# Patient Record
Sex: FEMALE | Race: BLACK OR AFRICAN AMERICAN | URBAN - METROPOLITAN AREA
[De-identification: names, ages, dates, MRNs, and addresses within clinical notes are randomized per-mention and may not be internally consistent; named-entity substitution may affect disease eponyms.]

---

## 2022-07-22 ENCOUNTER — OFFICE VISIT (OUTPATIENT)
Dept: FAMILY MEDICINE CLINIC | Age: 72
End: 2022-07-22
Payer: MEDICARE

## 2022-07-22 VITALS
TEMPERATURE: 97.6 F | DIASTOLIC BLOOD PRESSURE: 76 MMHG | HEART RATE: 63 BPM | HEIGHT: 67 IN | OXYGEN SATURATION: 98 % | RESPIRATION RATE: 16 BRPM | WEIGHT: 165 LBS | BODY MASS INDEX: 25.9 KG/M2 | SYSTOLIC BLOOD PRESSURE: 146 MMHG

## 2022-07-22 DIAGNOSIS — R73.03 PREDIABETES: ICD-10-CM

## 2022-07-22 DIAGNOSIS — E03.9 ACQUIRED HYPOTHYROIDISM: Primary | ICD-10-CM

## 2022-07-22 DIAGNOSIS — E78.2 MIXED HYPERLIPIDEMIA: ICD-10-CM

## 2022-07-22 DIAGNOSIS — Z11.59 ENCOUNTER FOR HEPATITIS C SCREENING TEST FOR LOW RISK PATIENT: ICD-10-CM

## 2022-07-22 DIAGNOSIS — Z00.00 ENCOUNTER FOR MEDICAL EXAMINATION TO ESTABLISH CARE: ICD-10-CM

## 2022-07-22 DIAGNOSIS — I10 PRIMARY HYPERTENSION: ICD-10-CM

## 2022-07-22 PROBLEM — M85.852 OSTEOPENIA OF LEFT HIP: Status: ACTIVE | Noted: 2022-07-22

## 2022-07-22 PROCEDURE — G8400 PT W/DXA NO RESULTS DOC: HCPCS | Performed by: STUDENT IN AN ORGANIZED HEALTH CARE EDUCATION/TRAINING PROGRAM

## 2022-07-22 PROCEDURE — 1123F ACP DISCUSS/DSCN MKR DOCD: CPT | Performed by: STUDENT IN AN ORGANIZED HEALTH CARE EDUCATION/TRAINING PROGRAM

## 2022-07-22 PROCEDURE — G8432 DEP SCR NOT DOC, RNG: HCPCS | Performed by: STUDENT IN AN ORGANIZED HEALTH CARE EDUCATION/TRAINING PROGRAM

## 2022-07-22 PROCEDURE — 3017F COLORECTAL CA SCREEN DOC REV: CPT | Performed by: STUDENT IN AN ORGANIZED HEALTH CARE EDUCATION/TRAINING PROGRAM

## 2022-07-22 PROCEDURE — 99204 OFFICE O/P NEW MOD 45 MIN: CPT | Performed by: STUDENT IN AN ORGANIZED HEALTH CARE EDUCATION/TRAINING PROGRAM

## 2022-07-22 PROCEDURE — G8536 NO DOC ELDER MAL SCRN: HCPCS | Performed by: STUDENT IN AN ORGANIZED HEALTH CARE EDUCATION/TRAINING PROGRAM

## 2022-07-22 PROCEDURE — 1090F PRES/ABSN URINE INCON ASSESS: CPT | Performed by: STUDENT IN AN ORGANIZED HEALTH CARE EDUCATION/TRAINING PROGRAM

## 2022-07-22 PROCEDURE — G8417 CALC BMI ABV UP PARAM F/U: HCPCS | Performed by: STUDENT IN AN ORGANIZED HEALTH CARE EDUCATION/TRAINING PROGRAM

## 2022-07-22 PROCEDURE — 1101F PT FALLS ASSESS-DOCD LE1/YR: CPT | Performed by: STUDENT IN AN ORGANIZED HEALTH CARE EDUCATION/TRAINING PROGRAM

## 2022-07-22 PROCEDURE — G8427 DOCREV CUR MEDS BY ELIG CLIN: HCPCS | Performed by: STUDENT IN AN ORGANIZED HEALTH CARE EDUCATION/TRAINING PROGRAM

## 2022-07-22 RX ORDER — VALSARTAN 80 MG/1
80 TABLET ORAL DAILY
Qty: 90 TABLET | Refills: 1 | Status: SHIPPED | OUTPATIENT
Start: 2022-07-22

## 2022-07-22 RX ORDER — VALSARTAN 80 MG/1
80 TABLET ORAL DAILY
COMMUNITY
End: 2022-07-22 | Stop reason: SDUPTHER

## 2022-07-22 RX ORDER — LEVOTHYROXINE SODIUM 88 UG/1
88 TABLET ORAL
COMMUNITY
End: 2022-07-22 | Stop reason: SDUPTHER

## 2022-07-22 RX ORDER — LEVOTHYROXINE SODIUM 88 UG/1
88 TABLET ORAL
Qty: 90 TABLET | Refills: 1 | Status: SHIPPED | OUTPATIENT
Start: 2022-07-22

## 2022-07-22 RX ORDER — CHOLECALCIFEROL (VITAMIN D3) 125 MCG
10 CAPSULE ORAL AS NEEDED
COMMUNITY

## 2022-07-22 RX ORDER — SIMVASTATIN 40 MG/1
1 TABLET, FILM COATED ORAL
COMMUNITY
Start: 2022-07-19 | End: 2022-07-22 | Stop reason: SDUPTHER

## 2022-07-22 RX ORDER — SIMVASTATIN 40 MG/1
40 TABLET, FILM COATED ORAL
Qty: 90 TABLET | Refills: 1 | Status: SHIPPED | OUTPATIENT
Start: 2022-07-22 | End: 2022-07-25

## 2022-07-22 NOTE — PROGRESS NOTES
Identified pt with two pt identifiers(name and ). Reviewed record in preparation for visit and have obtained necessary documentation. Chief Complaint   Patient presents with    Establish Care     Patient just moved from [place]. , PCP Dr Norma Thomas, eye exam last completed in , colonoscopy last completed about 4 years ago, dexa scan completed 2022, mammogram completed 2022, have not had shingrix vaccine, patient states tdap vaccine could potentially be after 10 years        Health Maintenance Due   Topic    Hepatitis C Screening     Depression Screen     COVID-19 Vaccine (1)    Lipid Screen     DTaP/Tdap/Td series (1 - Tdap)    Colorectal Cancer Screening Combo     Shingrix Vaccine Age 50> (1 of 2)    Breast Cancer Screen Mammogram     Bone Densitometry (Dexa) Screening     Pneumococcal 65+ years (1 - PCV)    Medicare Yearly Exam        Visit Vitals  BP (!) 146/76 (BP 1 Location: Right upper arm, BP Patient Position: Sitting, BP Cuff Size: Adult)   Pulse 63   Temp 97.6 °F (36.4 °C) (Oral)   Resp 16   Ht 5' 7.17\" (1.706 m)   Wt 165 lb (74.8 kg)   SpO2 98%   BMI 25.72 kg/m²         Coordination of Care Questionnaire:  :   1) Have you been to an emergency room, urgent care, or hospitalized since your last visit? If yes, where when, and reason for visit? Establishing care,  patient coming from [place]        Patient is accompanied by self I have received verbal consent from Alcon Leung to discuss any/all medical information while they are present in the room. Patient notified of elevated BP, this writer notified Physician of elevated BP.  BP rechecked and remains elevated at 146/76 at this time

## 2022-07-22 NOTE — PROGRESS NOTES
Janice Bhat is an 70 y.o. female who presents to [facility] care   Patient was previously receiving care at: moved in [place] [address]. history significant for: HTN, HLD, Hypothyroid, PreDM    Currently not having any complaints. Gyn Care  Menopausal - no bleeding    Mammogram 2022 - reports was normal - will request records. Review of Systems   General/Constitutional:   No headache, fever, fatigue, weight loss or weight gain       Eyes:   No redness, pruritis, pain, visual changes, swelling, or discharge      Ears:    No pain, loss or changes in hearing     Neck:   No swelling, masses, stiffness, pain, or limited movement     Cardiac:    No chest pain      Respiratory:   No cough or shortness of breath     GI:   No nausea/vomiting, diarrhea, abdominal pain, bloody or dark stools       :   No dysuria or  hematuria    Neurological:   No loss of consciousness, dizziness, seizures, dysarthria, cognitive changes, memory changes,  problems with balance, or unilateral weakness     Skin: No rash     Current Medications  Current medications include:   Current Outpatient Medications   Medication Sig    melatonin 5 mg tablet Take 10 mg by mouth as needed (at bedtime). levothyroxine (SYNTHROID) 88 mcg tablet Take 1 Tablet by mouth Daily (before breakfast). valsartan (DIOVAN) 80 mg tablet Take 1 Tablet by mouth in the morning. simvastatin (ZOCOR) 40 mg tablet Take 1 Tablet by mouth nightly. No current facility-administered medications for this visit.        Allergies  Allergies   Allergen Reactions    Aleve [Naproxen Sodium] Hives and Swelling       Past Medical History  Past Medical History:   Diagnosis Date    Hypercholesterolemia     Hypertension     Thyroid disease        Past Surgical History   Past Surgical History:   Procedure Laterality Date    HX  SECTION         Family History  Family History   Problem Relation Age of Onset    Hypertension Mother     Diabetes Mother      Social History  Social History     Socioeconomic History    Marital status: Not on file     Spouse name: Not on file    Number of children: Not on file    Years of education: Not on file    Highest education level: Not on file   Occupational History    Not on file   Tobacco Use    Smoking status: Never     Passive exposure: Never    Smokeless tobacco: Never   Vaping Use    Vaping Use: Never used   Substance and Sexual Activity    Alcohol use: Yes     Alcohol/week: 1.0 standard drink     Types: 1 Glasses of wine per week     Comment: occasional glass of wine    Drug use: Never    Sexual activity: Not Currently   Other Topics Concern    Not on file   Social History Narrative    Not on file     Social Determinants of Health     Financial Resource Strain: Low Risk     Difficulty of Paying Living Expenses: Not hard at all   Food Insecurity: No Food Insecurity    Worried About Running Out of Food in the Last Year: Never true    Ran Out of Food in the Last Year: Never true   Transportation Needs: Not on file   Physical Activity: Not on file   Stress: Not on file   Social Connections: Not on file   Intimate Partner Violence: Not on file   Housing Stability: Not on file       Immunizations    There is no immunization history on file for this patient. Objective   Vital Signs  Visit Vitals  BP (!) 146/76 (BP 1 Location: Right upper arm, BP Patient Position: Sitting, BP Cuff Size: Adult)   Pulse 63   Temp 97.6 °F (36.4 °C) (Oral)   Resp 16   Ht 5' 7.17\" (1.706 m)   Wt 165 lb (74.8 kg)   SpO2 98%   BMI 25.72 kg/m²     BP elevated x 2. Physical Examination  GEN: No apparent distress. Alert and oriented and responds to all questions appropriately. EYES:  Conjunctiva clear; pupils round and reactive to light; extraocular movements areintact. EAR: External ears are normal.  Tympanic membranes are clear and without effusion. NOSE: Turbinates are within normal limits. No drainage  OROPHYARYNX: No oral lesions or exudates.   NECK: Supple; no masses; thyroid normal           LUNGS: Respirations unlabored; clear to auscultation bilaterally  CARDIOVASCULAR: Regular, rate, and rhythm without murmurs, gallops or rubs   ABDOMEN: Soft; nontender; nondistended; normoactive bowel sounds; no masses or organomegaly  NEUROLOGIC:  No focal neurologic deficits. Strength and sensation grossly intact. Coordination and gait grossly intact. EXT: Well perfused. No edema. SKIN: No obvious rashes. Assessment:   Trecia Canavan is a 70 y.o. here to establish to care     Plan:   1. Acquired hypothyroidism Stable on levothyroxine. Recheck TSH today.  - TSH 3RD GENERATION; Future  - levothyroxine (SYNTHROID) 88 mcg tablet; Take 1 Tablet by mouth Daily (before breakfast). Dispense: 90 Tablet; Refill: 1  - TSH 3RD GENERATION    2. Mixed hyperlipidemia Stable on simvastatin  - encouraged lifestyle modifications  - LIPID PANEL; Future  - simvastatin (ZOCOR) 40 mg tablet; Take 1 Tablet by mouth nightly. Dispense: 90 Tablet; Refill: 1  - LIPID PANEL    3. Primary hypertension BP elevated today, but home log is okay. Just at goal < 150/90 given age. - keep BP log and follow up at next visit  - METABOLIC PANEL, COMPREHENSIVE; Future  - valsartan (DIOVAN) 80 mg tablet; Take 1 Tablet by mouth in the morning. Dispense: 90 Tablet; Refill: 1  - METABOLIC PANEL, COMPREHENSIVE    4. Prediabetes  - encourage lifestyle modifications  - HEMOGLOBIN A1C WITH EAG; Future  - HEMOGLOBIN A1C WITH EAG    5. Encounter for hepatitis C screening test for low risk patient  - HEPATITIS C AB; Future  - HEPATITIS C AB    6. Encounter for medical examination to establish care Updated hx and HM as able. - will need [address] next month  - request records from prior doctor      Counseled re: diet, exercise, healthy lifestyle  Appropriate labs ordered as listed above    I discussed the aforementioned diagnoses with the patient as well as the plan of care.  All questions were answered and an AVS was provided.      I discussed this patient with Dr. Toby Spain (Attending Physician)      Signed By:  Ary Berumen,

## 2022-07-23 LAB
ALBUMIN SERPL-MCNC: 4.4 G/DL (ref 3.5–5)
ALBUMIN/GLOB SERPL: 1.3 {RATIO} (ref 1.1–2.2)
ALP SERPL-CCNC: 69 U/L (ref 45–117)
ALT SERPL-CCNC: 26 U/L (ref 12–78)
ANION GAP SERPL CALC-SCNC: 3 MMOL/L (ref 5–15)
AST SERPL-CCNC: 15 U/L (ref 15–37)
BILIRUB SERPL-MCNC: 0.5 MG/DL (ref 0.2–1)
BUN SERPL-MCNC: 12 MG/DL (ref 6–20)
BUN/CREAT SERPL: 18 (ref 12–20)
CALCIUM SERPL-MCNC: 9.8 MG/DL (ref 8.5–10.1)
CHLORIDE SERPL-SCNC: 103 MMOL/L (ref 97–108)
CHOLEST SERPL-MCNC: 235 MG/DL
CO2 SERPL-SCNC: 31 MMOL/L (ref 21–32)
CREAT SERPL-MCNC: 0.68 MG/DL (ref 0.55–1.02)
EST. AVERAGE GLUCOSE BLD GHB EST-MCNC: 117 MG/DL
GLOBULIN SER CALC-MCNC: 3.3 G/DL (ref 2–4)
GLUCOSE SERPL-MCNC: 92 MG/DL (ref 65–100)
HBA1C MFR BLD: 5.7 % (ref 4–5.6)
HCV AB SERPL QL IA: NONREACTIVE
HDLC SERPL-MCNC: 53 MG/DL
HDLC SERPL: 4.4 {RATIO} (ref 0–5)
LDLC SERPL CALC-MCNC: 152.6 MG/DL (ref 0–100)
POTASSIUM SERPL-SCNC: 4 MMOL/L (ref 3.5–5.1)
PROT SERPL-MCNC: 7.7 G/DL (ref 6.4–8.2)
SODIUM SERPL-SCNC: 137 MMOL/L (ref 136–145)
TRIGL SERPL-MCNC: 147 MG/DL (ref ?–150)
TSH SERPL DL<=0.05 MIU/L-ACNC: 1.09 UIU/ML (ref 0.36–3.74)
VLDLC SERPL CALC-MCNC: 29.4 MG/DL

## 2022-07-25 DIAGNOSIS — E78.2 MIXED HYPERLIPIDEMIA: Primary | ICD-10-CM

## 2022-07-25 RX ORDER — ATORVASTATIN CALCIUM 40 MG/1
40 TABLET, FILM COATED ORAL DAILY
Qty: 90 TABLET | Refills: 1 | Status: SHIPPED | OUTPATIENT
Start: 2022-07-25

## 2022-07-25 NOTE — PROGRESS NOTES
A1C in prediabetic range - recommend lifestyle modifications  CMP okay  Lipid panel with elevated TC and LDL - ASCVD 18.1% indicating moderate to high intensity statin - currently on moderate intensity statin  TSH okay  Hep C NR    Tried to call patient with no answer - will send result letter with recommendations.

## 2022-08-22 ENCOUNTER — OFFICE VISIT (OUTPATIENT)
Dept: FAMILY MEDICINE CLINIC | Age: 72
End: 2022-08-22
Payer: MEDICARE

## 2022-08-22 VITALS
HEART RATE: 80 BPM | DIASTOLIC BLOOD PRESSURE: 76 MMHG | HEIGHT: 67 IN | WEIGHT: 168 LBS | BODY MASS INDEX: 26.37 KG/M2 | TEMPERATURE: 97.5 F | RESPIRATION RATE: 18 BRPM | SYSTOLIC BLOOD PRESSURE: 130 MMHG | OXYGEN SATURATION: 99 %

## 2022-08-22 DIAGNOSIS — Z00.00 INITIAL MEDICARE ANNUAL WELLNESS VISIT: Primary | ICD-10-CM

## 2022-08-22 DIAGNOSIS — Z71.89 ADVANCED CARE PLANNING/COUNSELING DISCUSSION: ICD-10-CM

## 2022-08-22 DIAGNOSIS — Z78.9 FULL CODE STATUS: ICD-10-CM

## 2022-08-22 PROCEDURE — 3017F COLORECTAL CA SCREEN DOC REV: CPT

## 2022-08-22 PROCEDURE — G0438 PPPS, INITIAL VISIT: HCPCS

## 2022-08-22 PROCEDURE — G8427 DOCREV CUR MEDS BY ELIG CLIN: HCPCS

## 2022-08-22 PROCEDURE — G8400 PT W/DXA NO RESULTS DOC: HCPCS

## 2022-08-22 PROCEDURE — 1101F PT FALLS ASSESS-DOCD LE1/YR: CPT

## 2022-08-22 PROCEDURE — G8417 CALC BMI ABV UP PARAM F/U: HCPCS

## 2022-08-22 PROCEDURE — G8536 NO DOC ELDER MAL SCRN: HCPCS

## 2022-08-22 PROCEDURE — G8432 DEP SCR NOT DOC, RNG: HCPCS

## 2022-08-22 PROCEDURE — 1123F ACP DISCUSS/DSCN MKR DOCD: CPT

## 2022-08-22 PROCEDURE — 93000 ELECTROCARDIOGRAM COMPLETE: CPT

## 2022-08-22 NOTE — ACP (ADVANCE CARE PLANNING)
Advance Care Planning     Advance Care Planning (ACP) Physician/NP/PA Conversation      Date of Conversation: 8/22/2022  Conducted with: Patient with Decision Making Capacity    Healthcare Decision Maker:   No healthcare decision makers have been documented. Click here to complete [address] including selection of the Healthcare Decision Maker Relationship (ie \"Primary\")  Primary daughter Kiarra Chauhan [phone]  Secondary Son Latanya Arce [phone]    Today we documented Decision Maker(s) consistent with Legal Next of Kin hierarchy. Care Preferences:    Hospitalization: \"If your health worsens and it becomes clear that your chance of recovery is unlikely, what would be your preference regarding hospitalization? \"  Yes The patient prefers hospitalization    Ventilation: \"If you were unable to breathe on your own and your chance of recovery was unlikely, what would be your preference about the use of a ventilator (breathing machine) if it was available to you? \"   The patient would desire the use of a ventilator. Resuscitation: \"In the event your heart stopped as a result of an underlying serious health condition, would you want attempts to be made to restart your heart, or would you prefer a natural death? \"   Yes, attempt to resuscitate.     Additional topics discussed: No    Conversation Outcomes / Follow-Up Plan:   ACP complete - no further action today  Reviewed DNR/DNI and patient elects Full Code (Attempt Resuscitation)     Length of Voluntary ACP Conversation in minutes:  16 minutes    Ofelia Centeno MD

## 2022-08-22 NOTE — PATIENT INSTRUCTIONS
Medicare Wellness Visit, Female     The best way to live healthy is to have a lifestyle where you eat a well-balanced diet, exercise regularly, limit alcohol use, and quit all forms of tobacco/nicotine, if applicable. Regular preventive services are another way to keep healthy. Preventive services (vaccines, screening tests, monitoring & exams) can help personalize your care plan, which helps you manage your own care. Screening tests can find health problems at the earliest stages, when they are easiest to treat. [facility] follows the current, evidence-based guidelines published by the [facility] ([facility]) when recommending preventive services for our patients. Because we follow these guidelines, sometimes recommendations change over time as research supports it. (For example, mammograms used to be recommended annually. Even though Medicare will still pay for an annual mammogram, the newer guidelines recommend a mammogram every two years for women of average risk). Of course, you and your doctor may decide to screen more often for some diseases, based on your risk and your co-morbidities (chronic disease you are already diagnosed with). Preventive services for you include:  - Medicare offers their members a free annual wellness visit, which is time for you and your primary care provider to discuss and plan for your preventive service needs. Take advantage of this benefit every year!  -All adults over the age of 72 should receive the recommended pneumonia vaccines. Current USPSTF guidelines recommend a series of two vaccines for the best pneumonia protection.   -All adults should have a flu vaccine yearly and a tetanus vaccine every 10 years.   -All adults age 48 and older should receive the shingles vaccines (series of two vaccines).       -All adults age 38-68 who are overweight should have a diabetes screening test once every three years.   -All adults born between [birth year] and 1965 should be screened once for Hepatitis C.  -Other screening tests and preventive services for persons with diabetes include: an eye exam to screen for diabetic retinopathy, a kidney function test, a foot exam, and stricter control over your cholesterol.   -Cardiovascular screening for adults with routine risk involves an electrocardiogram (ECG) at intervals determined by your doctor.   -Colorectal cancer screenings should be done for adults age 54-65 with no increased risk factors for colorectal cancer. There are a number of acceptable methods of screening for this type of cancer. Each test has its own benefits and drawbacks. Discuss with your doctor what is most appropriate for you during your annual wellness visit. The different tests include: colonoscopy (considered the best screening method), a fecal occult blood test, a fecal DNA test, and sigmoidoscopy.    -A bone mass density test is recommended when a woman turns 65 to screen for osteoporosis. This test is only recommended one time, as a screening. Some providers will use this same test as a disease monitoring tool if you already have osteoporosis. -Breast cancer screenings are recommended every other year for women of normal risk, age 54-69.  -Cervical cancer screenings for women over age 72 are only recommended with certain risk factors.      Here is a list of your current Health Maintenance items (your personalized list of preventive services) with a due date:  Health Maintenance Due   Topic Date Due    COVID-19 Vaccine (1) Never done    DTaP/Tdap/Td  (1 - Tdap) Never done    Colorectal Screening  Never done    Shingles Vaccine (1 of 2) Never done    Mammogram  Never done    Bone Mineral Density   Never done    Pneumococcal Vaccine (1 - PCV) Never done    Annual Well Visit  Never done

## 2022-08-22 NOTE — PROGRESS NOTES
[address]  [address] [facility] [address]  [phone]             Date of visit: 2022       This is an Initial Medicare Annual Wellness Visit (AWV), (Performed more than 12 months after effective date of Medicare Part B enrollment and 12 months after last preventive visit, Once in a lifetime)    I have reviewed the patient's medical history in detail and updated the computerized patient record. Audra Rodriguez is a 70 y.o. female   History obtained from: the patient. Concerns today   (Patient understands that medical problems addressed today may incur additional cost as this is a preventive visit)    History     Patient reports that she is currently compliant with her medications for high cholesterol , hypertension and hypothyroidism. She has a good diet in place ,varied with vegetables and fresh produce. She tries to exercise whenever she can. She gets a good night of sleep for 7-8 hours as well. Denies chest pain, SOB, wheeze, nausea ,vomiting, abdominal pain, diarrhea, constipation, headache, LOC, falls, and dizziness    Patient Active Problem List   Diagnosis Code    Primary hypertension I10    Mixed hyperlipidemia E78.2    Acquired hypothyroidism E03.9    Prediabetes R73.03    Osteopenia of left hip M85.852     Past Medical History:   Diagnosis Date    Hypercholesterolemia     Hypertension     Thyroid disease       Past Surgical History:   Procedure Laterality Date    HX  SECTION       Allergies   Allergen Reactions    Aleve [Naproxen Sodium] Hives and Swelling     Current Outpatient Medications   Medication Sig Dispense Refill    atorvastatin (LIPITOR) 40 mg tablet Take 1 Tablet by mouth in the morning. 90 Tablet 1    melatonin 5 mg tablet Take 10 mg by mouth as needed (at bedtime). levothyroxine (SYNTHROID) 88 mcg tablet Take 1 Tablet by mouth Daily (before breakfast).  90 Tablet 1    valsartan (DIOVAN) 80 mg tablet Take 1 Tablet by mouth in the morning. 80 Tablet 1     Family History   Problem Relation Age of Onset    Hypertension Mother     Diabetes Mother      Social History     Tobacco Use    Smoking status: Never     Passive exposure: Never    Smokeless tobacco: Never   Substance Use Topics    Alcohol use: Yes     Alcohol/week: 1.0 standard drink     Types: 1 Glasses of wine per week     Comment: occasional glass of wine       Specialists/Care Team   Ingrid Nettles has established care with the following healthcare providers:  Dr Jd Ochoa MD PCP [address]     Demographics   female  70 y.o. General Health Questions   -During the past 4 weeks:   -how would you rate your health in general? Good   -how often have you been bothered by feeling dizzy when standing up? never   -how much have you been bothered by bodily pain? mildly   -Have you noticed any hearing difficulties? Yes soft sounds 3 years,    -has your physical and emotional health limited your social activities with family or friends? no    Emotional Health Questions   -Do you have a history of depression, anxiety, or emotional problems? no  -Over the past 2 weeks, have you felt down, depressed or hopeless? no  -Over the past 2 weeks, have you felt little interest or pleasure in doing things? no    Health Habits   Please describe your diet habits: plant based mostly, occasional poultry meat  Do you get 5 servings of fruits or vegetables daily? yes  Do you exercise regularly? yes    Activities of Daily Living and Functional Status   -Do you need help with eating, walking, dressing, bathing, toileting, the phone, transportation, shopping, preparing meals, housework, laundry, medications or managing money? no  -In the past four weeks, was someone available to help you if you needed and wanted help with anything? yes  -Are you confident are you that you can control and manage most of your health problems?  yes  -Have you been given information to help you keep track of your medications? yes  -How often do you have trouble taking your medications as prescribed? never    Fall Risk and Home Safety   Have you fallen 2 or more times in the past year? no  Does your home have rugs in the hallway, lack grab bars in the bathroom, lack handrails on the stairs or have poor lighting? no  Do you have smoke detectors and check them regularly? yes  Do you have difficulties driving a car? no  Do you always fasten your seat belt when you are in a car? yes    Review of Systems (if indicated for problems addressed today)   The review of systems was unremarkable    Physical Examination     Vitals:    08/22/22 1327   BP: 130/76   Pulse: 80   Resp: 18   Temp: 97.5 °F (36.4 °C)   TempSrc: Temporal   SpO2: 99%   Weight: 168 lb (76.2 kg)   Height: 5' 7.17\" (1.706 m)     Body mass index is 26.18 kg/m². No results found.   Was the patient's timed Up & Go test unsteady or longer than 30 seconds? no    Evaluation of Cognitive Function   Mood/affect:  neutral  Orientation: Person, Place, Time, and Situation  Appearance: age appropriate  Family member/caregiver input: none    Additional exam if indicated for problems addressed today:  None    Advice/Referrals/Counseling (as indicated)   Education and counseling provided for any problems identified above: None    Preventive Services     (Preventive care checklist to be included in patient instructions)  Discussed today Done Previously Not Needed    yes Yes 2 dose no Pneumococcal vaccines   yes Yes 2018 No, Patient refused Flu vaccine   yes yes no Hepatitis B vaccine (if at risk)   yes Yes 2 dose no Shingles vaccine   yes Yes 2015 no TDAP vaccine   yes Yes 2/2022 no Mammogram   yes yes no Pap smear   yes Yes 2018 Asked to repeat in 5 years Colorectal cancer screening   yes Non smoker no Low-dose CT for lung cancer screening   yes 2022 osteopenia,taking vit d, daily,once every week no Bone density test   yes Yes 2022, opthalmologist no Glaucoma screening   yes Yes 2022 no Cholesterol test   yes Yes 2022 no Diabetes screening test    yes no no Diabetes self-management class   yes no no Nutritionist referral for diabetes or renal disease     Discussion of Advance Directive   Discussed with Mariajose Lovell her ability to prepare and advance directive in the case that an injury or illness causes her to be unable to make health care decisions. Yes. Completed today, ACP on file, Full code    Assessment/Plan   V70.0    ICD-10-CM ICD-9-CM    1. Initial Medicare annual wellness visit  Z00.00 V70.0 AMB POC EKG ROUTINE W/ 12 LEADS, INTER & REP      2. Full code status  Z78.9 V49.89 FULL CODE      3. Advanced care planning/counseling discussion  Z71.89 V65.49           Orders Placed This Encounter    FULL CODE    AMB POC EKG ROUTINE W/ 12 LEADS, INTER & REP     - EKG taken in clinic. Normal sinus rhythm, HR 60,  , , QTc 414   - Patient signed another Authorization to release medical records form today to get details from her PCP. Follow-up and Dispositions    Return in about 1 year (around 8/22/2023) for medicare subsequent visit.        I discussed the patient with Dr Rashmi Avalos MD (attending physician)      Signed By: Monica Cameron MD     August 22, 2022

## 2022-08-22 NOTE — PROGRESS NOTES
Medicare Annual Wellness Visit    Audra Rodriguez is a 70 y.o. female    Chief Complaint   Patient presents with    Annual Wellness Visit       1. Have you been to the ER, urgent care clinic since your last visit? Hospitalized since your last visit? No  2. Have you seen or consulted any other health care providers outside of the [address] [place] [address] since your last visit? Include any pap smears or colon screening. No  3. For patients aged 39-70: Has the patient had a colonoscopy / FIT/ Cologuard? Yes - Care Gap present. Rooming MA/LPN to request most recent results    If the patient is female:  4. For patients aged 41-77: Has the patient had a mammogram within the past 2 years? No  5. For patients aged 21-65: Has the patient had a pap smear?  NA - based on age or sex    Visit Vitals  /76 (BP 1 Location: Left upper arm, BP Patient Position: Sitting, BP Cuff Size: Adult)   Pulse 80   Temp 97.5 °F (36.4 °C) (Temporal)   Resp 18   Ht 5' 7.17\" (1.706 m)   Wt 168 lb (76.2 kg)   SpO2 99%   BMI 26.18 kg/m²           General Health Questions   -During the past 4 weeks:   -How would you rate your health in general? Good   -How often have you been bothered by feeling dizzy when standing up? never  -How much have you been bothered by bodily pain? mildly  -Has your physical and emotional health limited your social activities with family or friends? no    Emotional Health Questions   -Do you have a history of depression, anxiety, or emotional problems? no  -Over the past 2 weeks, have you felt down, depressed or hopeless? no  -Over the past 2 weeks, have you felt little interest or pleasure in doing things? no    Depression Risk Factor Screening     3 most recent PHQ Screens 8/22/2022   Little interest or pleasure in doing things Not at all   Feeling down, depressed, irritable, or hopeless Not at all   Total Score PHQ 2 0   Trouble falling or staying asleep, or sleeping too much -   Feeling tired or having little energy -   Poor appetite, weight loss, or overeating -   Feeling bad about yourself - or that you are a failure or have let yourself or your family down -   Trouble concentrating on things such as school, work, reading, or watching TV -   Moving or speaking so slowly that other people could have noticed; or the opposite being so fidgety that others notice -   Thoughts of being better off dead, or hurting yourself in some way -   PHQ 9 Score -   How difficult have these problems made it for you to do your work, take care of your home and get along with others -       Health Habits   -Please describe your diet habits: healthy, plant based  -Do you get 5 servings of fruits or vegetables daily? yes  -Do you exercise regularly? no    Alcohol & Drug Abuse Risk Screen    Do you average more than 1 drink per night or more than 7 drinks a week:  Yes    On any one occasion in the past three months have you have had more than 3 drinks containing alcohol:  No            Activities of Daily Living    -Do you need help with eating, walking, dressing, bathing, toileting, the phone, transportation, shopping, preparing meals, housework, laundry, medications or managing money? no  -In the past four weeks, was someone available to help you if you needed and wanted help with anything? yes  -Are you confident are you that you can control and manage most of your health problems? yes  -Have you been given information to help you keep track of your medications? yes  -How often do you have trouble taking your medications as prescribed? never  Patient does total self care   Hearing: Pt states she has noticed difficulties with hearing. Fall Risk and Home Safety   -Have you fallen 2 or more times in the past year? no  -Does your home have rugs in the hallways? no,   -Do you have grab bars in the bathrooms?  no,   -Does your home have handrails on the stairs?  yes,   -Do you have adequate lighting in your home? yes  -Do you have smoke detectors and check them regularly? yes  -Do you have difficulties driving a car/vehicle? no  -Do you always fasten your seat belt when you are in a car? yes    Fall Risk:  Fall Risk Assessment, last 12 mths 8/22/2022   Able to walk? Yes   Fall in past 12 months? 0   Do you feel unsteady?  0   Are you worried about falling 0       Abuse Screen:  Patient is not abused    Cognitive Screening    Has your family/caregiver stated any concerns about your memory: no         Health Maintenance Due     Health Maintenance Due   Topic Date Due    COVID-19 Vaccine (1) Never done    DTaP/Tdap/Td series (1 - Tdap) Never done    Colorectal Cancer Screening Combo  Never done    Shingrix Vaccine Age 50> (1 of 2) Never done    Breast Cancer Screen Mammogram  Never done    Bone Densitometry (Dexa) Screening  Never done    Pneumococcal 65+ years (1 - PCV) Never done       [employer]

## 2022-11-29 ENCOUNTER — VIRTUAL VISIT (OUTPATIENT)
Dept: FAMILY MEDICINE CLINIC | Age: 72
End: 2022-11-29
Payer: MEDICARE

## 2022-11-29 DIAGNOSIS — R09.81 NASAL CONGESTION: ICD-10-CM

## 2022-11-29 DIAGNOSIS — J11.1 INFLUENZA: ICD-10-CM

## 2022-11-29 DIAGNOSIS — R05.8 COUGH WITH SPUTUM: ICD-10-CM

## 2022-11-29 DIAGNOSIS — J01.90 ACUTE NON-RECURRENT SINUSITIS, UNSPECIFIED LOCATION: Primary | ICD-10-CM

## 2022-11-29 RX ORDER — IPRATROPIUM BROMIDE 42 UG/1
2 SPRAY, METERED NASAL 3 TIMES DAILY
Qty: 15 ML | Refills: 0 | Status: SHIPPED | OUTPATIENT
Start: 2022-11-29 | End: 2022-12-03

## 2022-11-29 RX ORDER — AMOXICILLIN AND CLAVULANATE POTASSIUM 875; 125 MG/1; MG/1
1 TABLET, FILM COATED ORAL EVERY 12 HOURS
Qty: 14 TABLET | Refills: 0 | Status: SHIPPED | OUTPATIENT
Start: 2022-11-29 | End: 2022-12-06

## 2022-11-29 NOTE — PROGRESS NOTES
Bharat Arias  70 y.o. female  1950  [address]  [id number]   [address]:    Telemedicine Progress Note  Carlos Leigh MD       Encounter Date and Time: November 29, 2022 at 2:31 PM    Consent:  She and/or the health care decision maker is aware that that she may receive a bill for this telephone service, depending on her insurance coverage, and has provided verbal consent to proceed: Yes    Chief Complaint   Patient presents with    Cold     History of Present Illness   Bharat Arias is a 70 y.o. female was evaluated by synchronous (real-time) audio-video technology from home, through the [address] [facility] Patient Portal.    - Started feeling sick >2 weeks ago - now in third week soon  - ER on 11/18. Covid, flu and PNA tests. Tested positive for flu. Given prednisone for 5 days  - Not feeling better  +Nasal congestion, prev clear rhinorrhea, now dark/green colored. Initially  congestion improved an now worsening  +cough with sputum production  - Feels like she is not getting better  - Tried OTC meds, including mucinex, but nothing helped   - No chronic lung problems  +SOB with activity    Review of Systems   Review of Systems   Constitutional:  Negative for fever. HENT:  Positive for congestion. Respiratory:  Positive for cough and sputum production. Cardiovascular:  Negative for chest pain and leg swelling. Vitals/Objective:     General: alert, cooperative, no distress   Mental  status: mental status: alert, oriented to person, place, and time, normal mood, behavior, speech, dress, motor activity, and thought processes   Resp: resp: normal effort and no respiratory distress. +Cough   Neuro: neuro: no gross deficits   Skin: skin: no discoloration or lesions of concern on visible areas   Due to this being a TeleHealth evaluation, many elements of the physical examination are unable to be assessed.       Assessment and Plan:       ICD-10-CM ICD-9-CM    1. Acute non-recurrent sinusitis, unspecified location  J01.90 461.9 amoxicillin-clavulanate (AUGMENTIN) 875-125 mg per tablet      2. Cough with sputum  R05.8 786.2 benzocaine-menthoL (Cepacol Sore Throat, deepti-men,) 15-2.6 mg lozg lozenge      3. Nasal congestion  R09.81 478.19 ipratropium (ATROVENT) 42 mcg (0.06 %) nasal spray      4. Influenza  J11.1 487.1         Sinusitis: Acute  - Augmentin 875-125mg BID for 7 days  - Atrovent nasal spray     Cough with sputum: Acute  - Discussed repeating CXR, shared decision making pt decided to hold off at this time   - Augmentin to cover potential PNA  - Supportive care with tea with honey/lemon    Influenza: Tested positive on 11/18 in the ER  - Continue supportive care  - Too far out for Tamiflu    Time spent in direct conversation with the patient to include medical condition(s) discussed, assessment and treatment plan:       We discussed the expected course, resolution and complications of the diagnosis(es) in detail. Medication risks, benefits, costs, interactions, and alternatives were discussed as indicated. I advised her to contact the office if her condition worsens, changes or fails to improve as anticipated. She expressed understanding with the diagnosis(es) and plan. Patient understands that this encounter was a temporary measure, and the importance of further follow up and examination was emphasized. Patient verbalized understanding. Patient informed to follow up: 1 week if not improving. Follow-up and Dispositions    Return in about 1 week (around 12/6/2022) for In clinic for cough and nasal congestion if not improving. Electronically Signed: Tila Franks MD    Providers location when delivering service (clinic, hospital, home): home    CPT Codes 95980-90555 for Established Patients may apply to this Telehealth Visit. POS code: 18.   Modifier GT      Pursuant to the emergency declaration under the [address] and the National Emergencies Act, [facility] waiver authority and the [employer] and [employer] General Act, this Virtual  Visit was conducted, with patient's consent, to reduce the patient's risk of exposure to COVID-19 and provide continuity of care for an established patient. Services were provided through a video synchronous discussion virtually to substitute for in-person clinic visit. History   Patients past medical, surgical and family histories were reviewed and updated. Past Medical History:   Diagnosis Date    Hypercholesterolemia     Hypertension     Thyroid disease      Past Surgical History:   Procedure Laterality Date    HX  SECTION       Family History   Problem Relation Age of Onset    Hypertension Mother     Diabetes Mother      Social History     Socioeconomic History    Marital status: Not on file     Spouse name: Not on file    Number of children: Not on file    Years of education: Not on file    Highest education level: Not on file   Occupational History    Not on file   Tobacco Use    Smoking status: Never     Passive exposure: Never    Smokeless tobacco: Never   Vaping Use    Vaping Use: Never used   Substance and Sexual Activity    Alcohol use:  Yes     Alcohol/week: 1.0 standard drink     Types: 1 Glasses of wine per week     Comment: occasional glass of wine    Drug use: Never    Sexual activity: Not Currently   Other Topics Concern    Not on file   Social History Narrative    Not on file     Social Determinants of Health     Financial Resource Strain: Low Risk     Difficulty of Paying Living Expenses: Not hard at all   Food Insecurity: No Food Insecurity    Worried About Running Out of Food in the Last Year: Never true    Ran Out of Food in the Last Year: Never true   Transportation Needs: Not on file   Physical Activity: Not on file   Stress: Not on file   Social Connections: Not on file   Intimate Partner Violence: Not on file   Housing Stability: Not on file     Patient Active Problem List   Diagnosis Code    Primary hypertension I10    Mixed hyperlipidemia E78.2    Acquired hypothyroidism E03.9    Prediabetes R73.03    Osteopenia of left hip M85.852          Current Medications/Allergies   Medications and Allergies reviewed:    Current Outpatient Medications   Medication Sig Dispense Refill    amoxicillin-clavulanate (AUGMENTIN) 875-125 mg per tablet Take 1 Tablet by mouth every twelve (12) hours for 7 days. 14 Tablet 0    ipratropium (ATROVENT) 42 mcg (0.06 %) nasal spray 2 Sprays by Both Nostrils route three (3) times daily for 4 days. 15 mL 0    benzocaine-menthoL (Cepacol Sore Throat, deepti-men,) 15-2.6 mg lozg lozenge Take 1 Lozenge by mouth every two (2) hours as needed for Sore throat. 50 Lozenge 0    atorvastatin (LIPITOR) 40 mg tablet Take 1 Tablet by mouth in the morning. 90 Tablet 1    melatonin 5 mg tablet Take 10 mg by mouth as needed (at bedtime). levothyroxine (SYNTHROID) 88 mcg tablet Take 1 Tablet by mouth Daily (before breakfast). 90 Tablet 1    valsartan (DIOVAN) 80 mg tablet Take 1 Tablet by mouth in the morning.  90 Tablet 1     Allergies   Allergen Reactions    Aleve [Naproxen Sodium] Hives and Swelling

## 2022-12-12 ENCOUNTER — OFFICE VISIT (OUTPATIENT)
Dept: FAMILY MEDICINE CLINIC | Age: 72
End: 2022-12-12
Payer: MEDICARE

## 2022-12-12 VITALS
HEIGHT: 67 IN | TEMPERATURE: 97.3 F | SYSTOLIC BLOOD PRESSURE: 126 MMHG | RESPIRATION RATE: 16 BRPM | OXYGEN SATURATION: 97 % | BODY MASS INDEX: 26.53 KG/M2 | DIASTOLIC BLOOD PRESSURE: 74 MMHG | HEART RATE: 81 BPM | WEIGHT: 169 LBS

## 2022-12-12 DIAGNOSIS — Z12.31 ENCOUNTER FOR SCREENING MAMMOGRAM FOR MALIGNANT NEOPLASM OF BREAST: ICD-10-CM

## 2022-12-12 DIAGNOSIS — M22.2X1 PATELLOFEMORAL PAIN SYNDROME OF RIGHT KNEE: ICD-10-CM

## 2022-12-12 DIAGNOSIS — I10 PRIMARY HYPERTENSION: Primary | ICD-10-CM

## 2022-12-12 DIAGNOSIS — E78.2 MIXED HYPERLIPIDEMIA: ICD-10-CM

## 2022-12-12 DIAGNOSIS — E03.9 ACQUIRED HYPOTHYROIDISM: ICD-10-CM

## 2022-12-12 DIAGNOSIS — Z12.11 COLON CANCER SCREENING: ICD-10-CM

## 2022-12-12 DIAGNOSIS — R42 ORTHOSTATIC LIGHTHEADEDNESS: ICD-10-CM

## 2022-12-12 RX ORDER — ATORVASTATIN CALCIUM 40 MG/1
40 TABLET, FILM COATED ORAL DAILY
Qty: 90 TABLET | Refills: 2 | Status: SHIPPED | OUTPATIENT
Start: 2022-12-12 | End: 2022-12-15

## 2022-12-12 RX ORDER — LEVOTHYROXINE SODIUM 88 UG/1
88 TABLET ORAL
Qty: 90 TABLET | Refills: 2 | Status: SHIPPED | OUTPATIENT
Start: 2022-12-12

## 2022-12-12 RX ORDER — VALSARTAN 80 MG/1
80 TABLET ORAL DAILY
Qty: 90 TABLET | Refills: 2 | Status: SHIPPED | OUTPATIENT
Start: 2022-12-12

## 2022-12-12 NOTE — PROGRESS NOTES
[address] [place] [address]   Office [phone]  Fax [phone]     2022   Chief Complaint   Patient presents with    Follow Up Chronic Condition       HPI:  Chanda Mendoza is a 70 y.o. female who presents to clinic today for follow up chronic conditions    HTN:  Reviewed home BP [id number] systolic  Meds: Valsartan 80mg daily  ROS: Denies chest pain on exertion, MALIK, HA. +orthostatic dizziness/lightheadedness on standing  Diet: Eats salt  Exercise: Walking. Integrating walking into her day  Tobacco use: No  Alcohol use: Socially  NSAID use: No    HLD:   - On lipitor 40mg daily    R knee pain  - Intermittent, but now constant  - Aggravated by bending knee (eg. Walking down stairs or sitting in chair)  - Alleviated by keeping knee straight  - No weakness      Patients past medical, surgical and family histories were reviewed. Allergies and Medications reviewed and updated. ROS: See HPI    Objective:  Vitals:    22 1028 22 1100   BP: (!) 143/77 126/74   Pulse: 81    Resp: 16    Temp: 97.3 °F (36.3 °C)    TempSrc: Temporal    SpO2: 97%    Weight: 169 lb (76.7 kg)    Height: 5' 7.17\" (1.706 m)      Body mass index is 26.34 kg/m². Physical Exam  General: Patient alert and oriented and in NAD  HEENT: PER/EOMI, no conjunctival pallor or scleral icterus. Heart: Regular rate and rhythm, No murmurs, rubs or gallops.   2+ peripheral pulses  Lungs: Clear to auscultation bilaterally, no wheezing, rales or rhonchi  Abd: +BS, non-tender, non-distended  Ext: No edema  Skin: No rashes or lesions noted on exposed skin,  Psych: Appropriate mood and affect    Musculoskeletal:  Knee: right  Knee Effusion: None  Quadriceps atrophy: None   Popliteal (Bakers) Cyst: Negative   Patellofemoral crepitus: Negative    ROM:  Flexion: 130  Extension: 0   Hip IR/ER: FROM without pain    Dynamic Test:  Gait: Normal   Assistive devices: None  Squat: +Pain    Palpation:   Patella tenderness: None  Patellar tendon tenderness: None  Quad tendon tenderness: None  Medial joint line tenderness: None  Lateral joint line tenderness: None  MCL tenderness: None  LCL tenderness: None  Medial facet tenderness: None  Lateral facet tenderness: None  Condyle tenderness: None  Tibia tubercle tenderness: None  Proximal fibula tenderness: None  Plica tenderness: None  Prepatellar bursa tenderness: None  Pes bursa tenderness: None  ITB tenderness: None    Ligament/Meniscal Exam:   Anterior Drawer: Negative   Posterior Drawer: Negative   Valgus stress: Negative with good endpoint   Varus stress: Negative with good endpoint     Strength (0-5/5):   Flexion: Left: 5/5    Right: 5/5    Extension: Left: 5/5    Right: 5/5    Hip abduction: 5/5    Hip adduction: 5/5          No results found for this or any previous visit (from the past 24 hour(s)). Assessment and Plan:  Diagnoses and all orders for this visit:    1. Primary hypertension  Assessment & Plan:  Chronic. Improved to goal on recheck in clinic  - Continue Valsartan 80mg daily  - Check kidney fnx, electrolytes  - Counseling low Na diet    Orders:  -     valsartan (DIOVAN) 80 mg tablet; Take 1 Tablet by mouth daily.  -     METABOLIC PANEL, BASIC; Future    2. Orthostatic lightheadedness  Assessment & Plan:  Intermittent on standing  - Counseling lifestyle modifications (eg. Slowly and gradually getting up from bed/chair and holding on to something)  - Compression socks      3. Acquired hypothyroidism  Assessment & Plan:  Chronic, well controlled  - Continue Synthroid 88mcg daily    Orders:  -     levothyroxine (SYNTHROID) 88 mcg tablet; Take 1 Tablet by mouth Daily (before breakfast). 4. Mixed hyperlipidemia  Assessment & Plan:  Chronic, previously not controlled  - Recheck Lipids on higher dose of statin    Orders:  -     atorvastatin (LIPITOR) 40 mg tablet; Take 1 Tablet by mouth daily.  -     LIPID PANEL; Future    5.  Patellofemoral pain syndrome of right knee  Assessment & Plan:  Chronic, intermittent however worsening  - Exercises and referral to PT    Orders:  -     REFERRAL TO PHYSICAL THERAPY    6. Colon cancer screening  -     REFERRAL TO GASTROENTEROLOGY    7. Encounter for screening mammogram for malignant neoplasm of breast  -     [facility] MAMMO BI SCREENING INCL CAD; Future      Follow-up and Dispositions    Return in about 6 months (around 6/12/2023) for Chronic conditions. I have discussed the aforementioned diagnoses and plan with the patient in detail. I have provided information in person and/or in AVS. All questions answered prior to discharge.      I discussed this patient with Dr. Taty Fraah (Attending Physician)   Signed By:  Xavi Cantu MD     Family Medicine Resident

## 2022-12-12 NOTE — PROGRESS NOTES
Brendan Tejada is a 70 y.o. female    Chief Complaint   Patient presents with    Follow Up Chronic Condition         1. Have you been to the ER, urgent care clinic since your last visit? Hospitalized since your last visit? No  2. Have you seen or consulted any other health care providers outside of the [address] [place] [address] since your last visit? Include any pap smears or colon screening.  No    Visit Vitals  BP (!) 143/77 (BP 1 Location: Left upper arm, BP Patient Position: Sitting)   Pulse 81   Temp 97.3 °F (36.3 °C) (Temporal)   Resp 16   Ht 5' 7.17\" (1.706 m)   Wt 169 lb (76.7 kg)   SpO2 97%   BMI 26.34 kg/m²     3 most recent PHQ Screens 8/22/2022   Little interest or pleasure in doing things Not at all   Feeling down, depressed, irritable, or hopeless Not at all   Total Score PHQ 2 0   Trouble falling or staying asleep, or sleeping too much -   Feeling tired or having little energy -   Poor appetite, weight loss, or overeating -   Feeling bad about yourself - or that you are a failure or have let yourself or your family down -   Trouble concentrating on things such as school, work, reading, or watching TV -   Moving or speaking so slowly that other people could have noticed; or the opposite being so fidgety that others notice -   Thoughts of being better off dead, or hurting yourself in some way -   PHQ 9 Score -   How difficult have these problems made it for you to do your work, take care of your home and get along with others -     Health Maintenance Due   Topic Date Due    DTaP/Tdap/Td series (1 - Tdap) Never done    Colorectal Cancer Screening Combo  Never done    Shingrix Vaccine Age 50> (1 of 2) Never done    Breast Cancer Screen Mammogram  Never done    Bone Densitometry (Dexa) Screening  Never done    Pneumococcal 65+ years (1 - PCV) Never done     Mammogram: Feb last year, supposed to be annually  Bone Density: Feb last year  Colonoscopy: Jan 2018, had a polyp due back next year    Vaccines:   - Unsure about shingles, believes she's gotten tetanus and pneumonia.  - Declines covid and flu

## 2022-12-12 NOTE — ASSESSMENT & PLAN NOTE
Intermittent on standing  - Counseling lifestyle modifications (eg.  Slowly and gradually getting up from bed/chair and holding on to something)  - Compression socks

## 2022-12-12 NOTE — PATIENT INSTRUCTIONS
Dr. Marcial Medina (Gastroenterology) - [phone]      Please call to schedule your appointment with provider/location then call our office back @ #[phone] to leave a message for our referral coordinator with the appointment information (date/time/providers full name) for whom you will be seeing for this referral order so that we can authorize your visit(s) with your insurance if needed and referral tracking purposes of this order. Medicare Wellness Visit, Female     The best way to live healthy is to have a lifestyle where you eat a well-balanced diet, exercise regularly, limit alcohol use, and quit all forms of tobacco/nicotine, if applicable. Regular preventive services are another way to keep healthy. Preventive services (vaccines, screening tests, monitoring & exams) can help personalize your care plan, which helps you manage your own care. Screening tests can find health problems at the earliest stages, when they are easiest to treat. [facility] follows the current, evidence-based guidelines published by the [facility] (USPSTF) when recommending preventive services for our patients. Because we follow these guidelines, sometimes recommendations change over time as research supports it. (For example, mammograms used to be recommended annually. Even though Medicare will still pay for an annual mammogram, the newer guidelines recommend a mammogram every two years for women of average risk). Of course, you and your doctor may decide to screen more often for some diseases, based on your risk and your co-morbidities (chronic disease you are already diagnosed with). Preventive services for you include:  - Medicare offers their members a free annual wellness visit, which is time for you and your primary care provider to discuss and plan for your preventive service needs.  Take advantage of this benefit every year!    -Over the age of 72 should receive the recommended pneumonia vaccines.    -All adults should have a flu vaccine yearly.  -All adults should have a tetanus vaccine every 10 years.   -Over the age 48 should receive the shingles vaccines.        -All adults should be screened once for Hepatitis C.  -All adults age 38-68 who are overweight should have a diabetes screening test once every three years.   -Other screening tests and preventive services for persons with diabetes include: an eye exam to screen for diabetic retinopathy, a kidney function test, a foot exam, and stricter control over your cholesterol.   -Cardiovascular screening for adults with routine risk involves an electrocardiogram (ECG) at intervals determined by your doctor.     -Colorectal cancer screenings should be done for adults age 39-70 with no increased risk factors for colorectal cancer. There are a number of acceptable methods of screening for this type of cancer. Each test has its own benefits and drawbacks. Discuss with your doctor what is most appropriate for you during your annual wellness visit. The different tests include: colonoscopy (considered the best screening method), a fecal occult blood test, a fecal DNA test, and sigmoidoscopy.    -Lung cancer screening is recommended annually with a low dose CT scan for adults between age 54 and 68, who have smoked at least 30 pack years (equivalent of 1 pack per day for 30 days), and who is a current smoker or quit less than 15 years ago.    -A bone mass density test is recommended when a woman turns 65 to screen for osteoporosis. This test is only recommended one time, as a screening. Some providers will use this same test as a disease monitoring tool if you already have osteoporosis. -Breast cancer screenings are recommended every other year for women of normal risk, age 54-69.    -Cervical cancer screenings for women over age 72 are only recommended with certain risk factors.      Here is a list of your current Health Maintenance items (your personalized list of preventive services) with a due date:  Health Maintenance Due   Topic Date Due    DTaP/Tdap/Td  (1 - Tdap) Never done    Colorectal Screening  Never done    Shingles Vaccine (1 of 2) Never done    Mammogram  Never done    Bone Mineral Density   Never done    Pneumococcal Vaccine (1 - PCV) Never done

## 2022-12-12 NOTE — ASSESSMENT & PLAN NOTE
Chronic.  Improved to goal on recheck in clinic  - Continue Valsartan 80mg daily  - Check kidney fnx, electrolytes  - Counseling low Na diet

## 2022-12-13 LAB
ANION GAP SERPL CALC-SCNC: 3 MMOL/L (ref 5–15)
BUN SERPL-MCNC: 11 MG/DL (ref 6–20)
BUN/CREAT SERPL: 17 (ref 12–20)
CALCIUM SERPL-MCNC: 9.2 MG/DL (ref 8.5–10.1)
CHLORIDE SERPL-SCNC: 105 MMOL/L (ref 97–108)
CHOLEST SERPL-MCNC: 190 MG/DL
CO2 SERPL-SCNC: 32 MMOL/L (ref 21–32)
CREAT SERPL-MCNC: 0.66 MG/DL (ref 0.55–1.02)
GLUCOSE SERPL-MCNC: 91 MG/DL (ref 65–100)
HDLC SERPL-MCNC: 48 MG/DL
HDLC SERPL: 4 {RATIO} (ref 0–5)
LDLC SERPL CALC-MCNC: 112.6 MG/DL (ref 0–100)
POTASSIUM SERPL-SCNC: 4 MMOL/L (ref 3.5–5.1)
SODIUM SERPL-SCNC: 140 MMOL/L (ref 136–145)
TRIGL SERPL-MCNC: 147 MG/DL (ref ?–150)
VLDLC SERPL CALC-MCNC: 29.4 MG/DL

## 2022-12-15 ENCOUNTER — TELEPHONE (OUTPATIENT)
Dept: FAMILY MEDICINE CLINIC | Age: 72
End: 2022-12-15

## 2022-12-15 DIAGNOSIS — E78.2 MIXED HYPERLIPIDEMIA: Primary | ICD-10-CM

## 2022-12-15 RX ORDER — ATORVASTATIN CALCIUM 80 MG/1
80 TABLET, FILM COATED ORAL DAILY
Qty: 90 TABLET | Refills: 3 | Status: SHIPPED | OUTPATIENT
Start: 2022-12-15

## 2023-01-18 DIAGNOSIS — E03.9 ACQUIRED HYPOTHYROIDISM: ICD-10-CM

## 2023-01-19 RX ORDER — LEVOTHYROXINE SODIUM 88 UG/1
88 TABLET ORAL
Qty: 90 TABLET | Refills: 2 | Status: SHIPPED | OUTPATIENT
Start: 2023-01-19

## 2023-02-02 ENCOUNTER — OFFICE VISIT (OUTPATIENT)
Dept: FAMILY MEDICINE CLINIC | Age: 73
End: 2023-02-02
Payer: MEDICARE

## 2023-02-02 VITALS
BODY MASS INDEX: 26.18 KG/M2 | SYSTOLIC BLOOD PRESSURE: 162 MMHG | DIASTOLIC BLOOD PRESSURE: 80 MMHG | TEMPERATURE: 98 F | WEIGHT: 168 LBS | OXYGEN SATURATION: 94 % | HEART RATE: 74 BPM

## 2023-02-02 DIAGNOSIS — G89.29 CHRONIC PAIN OF RIGHT KNEE: ICD-10-CM

## 2023-02-02 DIAGNOSIS — M75.01 ADHESIVE CAPSULITIS OF RIGHT SHOULDER: Primary | ICD-10-CM

## 2023-02-02 DIAGNOSIS — M25.561 CHRONIC PAIN OF RIGHT KNEE: ICD-10-CM

## 2023-02-02 DIAGNOSIS — I10 PRIMARY HYPERTENSION: ICD-10-CM

## 2023-02-02 NOTE — PROGRESS NOTES
[address] [place] [address]   Office [phone]  Fax [phone]     2/2/2023   Chief Complaint   Patient presents with    Arm Pain     C/o right arm and right shoulder pain, decreased range of motion for 4 weeks now. No recent accident or trauma. Not taking pain medications. 1 out of 10 severity. HPI:  Sita Urena is a 67 y.o. female who presents to clinic today for shoulder pain    R shoulder pain  - present for 4 weeks  - currently 1/10 pain. Sometimes worse  - decreased ROM of shoulder  - aggravated by moving shoulder   - alleviated by keeping it still, improved by taking hot shower  - has not needed to take any medications for pain  - No trauma or injury or fall  - No weakness or paresthesia    Patients past medical, surgical and family histories were reviewed. Allergies and Medications reviewed and updated. ROS: See HPI    Objective:  Vitals:    02/02/23 1313 02/02/23 1317   BP: (!) 161/88 (!) 162/80   Pulse: 74    Temp: 98 °F (36.7 °C)    TempSrc: Oral    SpO2: 94%    Weight: 168 lb (76.2 kg)      Body mass index is 26.18 kg/m². Physical Exam  General: Patient alert and oriented and in NAD  HEENT: PER/EOMI, no conjunctival pallor or scleral icterus.     Lungs: Unlabored  Abd: non-distended  Ext: No edema  Skin: No rashes or lesions noted on exposed skin,  Psych: Appropriate mood and affect    Shoulder: right  Deformity: None    ROM:  Forward Flexion: Active: 180   Passive: 180  ER (0): Active: 30   Passive: 30  IR (0): Active: Behind the body to the level T8 (less than left side)   Abduction: Active: 180   Passive: 180    Palpation:  AC tenderness: None  SC tenderness: None  Clavicle tenderness: None  Biceps tenderness: None    Strength (0-5/5):  Deltoid - Anterior: 5/5  Deltoid - Posterior: 5/5  Deltoid - Mid: 5/5  Supraspinatus: 5/5  External rotation: 5/5  Internal rotation: 5/5    Rotator Cuff Exam:  Neers sign: Negative  Haskins sign: Negative  Painful Arc: Negative  Lift-off sign / Belly Press: Negative    Biceps/Labrum/AC Exam:  Yergasons Test: Negative  Speeds Test: Negative  ORivers Sign: Negative  Cross-chest adduction: Negative    Neuro/Vascular:  Pulses intact, no edema, and neurologically intact    C-Spine:  Cervical motion: FROM without pain. Cervical tenderness: None  Spurlings test: Negative     No results found for this or any previous visit (from the past 24 hour(s)). Assessment and Plan:  Diagnoses and all orders for this visit:    1. Adhesive capsulitis of right shoulder  Assessment & Plan:  Acute  - Counseling exercises several times per day  - Follow up with PT  - If not improving or worsening can consider following up with sports medicine for possible injection    Orders:  -     REFERRAL TO PHYSICAL THERAPY    2. Chronic pain of right knee  Assessment & Plan:  Chronic. Previously diagnosed with patellofemoral pain sx never started therapy, would like xrays today  - Right knee xray  - Follow up with PT    Orders:  -     XR KNEE RT MIN 4 V; Future  -     REFERRAL TO PHYSICAL THERAPY    3. Primary hypertension  Assessment & Plan:  Likely elevated 2/2 pain today  - Follow up next visit to ensure improving         Follow-up and Dispositions    Return in about 6 weeks (around 3/16/2023) for Sports medicine follow up if not better. I have discussed the aforementioned diagnoses and plan with the patient in detail. I have provided information in person and/or in AVS. All questions answered prior to discharge.      I discussed this patient with Dr. Janice Denise (Attending Physician)   Signed By:  Salvatore Cardona MD     Family Medicine Resident

## 2023-02-02 NOTE — ASSESSMENT & PLAN NOTE
Chronic.  Previously diagnosed with patellofemoral pain sx never started therapy, would like xrays today  - Right knee xray  - Follow up with PT

## 2023-02-02 NOTE — PROGRESS NOTES
Identified pt with two pt identifiers(name and ). Reviewed record in preparation for visit and have obtained necessary documentation. All patient medications has been reviewed. Chief Complaint   Patient presents with    Arm Pain     C/o right arm and right shoulder pain, decreased range of motion for 4 weeks now. No recent accident or trauma. Not taking pain medications. 1 out of 10 severity. Vitals:    23 1313 23 1317   BP: (!) 161/88 (!) 162/80   Pulse: 74    Temp: 98 °F (36.7 °C)    TempSrc: Oral    SpO2: 94%    Weight: 168 lb (76.2 kg)        1. Have you been to the ER, urgent care clinic since your last visit? Hospitalized since your last visit? No    2. Have you seen or consulted any other health care providers outside of the [address] [place] [address] since your last visit? Include any pap smears or colon screening.  No

## 2023-02-02 NOTE — ASSESSMENT & PLAN NOTE
Acute  - Counseling exercises several times per day  - Follow up with PT  - If not improving or worsening can consider following up with sports medicine for possible injection

## 2023-02-07 ENCOUNTER — TELEPHONE (OUTPATIENT)
Dept: FAMILY MEDICINE CLINIC | Age: 73
End: 2023-02-07

## 2023-02-17 ENCOUNTER — HOSPITAL ENCOUNTER (OUTPATIENT)
Dept: MAMMOGRAPHY | Age: 73
Discharge: HOME | End: 2023-02-17
Attending: STUDENT IN AN ORGANIZED HEALTH CARE EDUCATION/TRAINING PROGRAM
Payer: MEDICARE

## 2023-02-17 DIAGNOSIS — Z12.31 ENCOUNTER FOR SCREENING MAMMOGRAM FOR MALIGNANT NEOPLASM OF BREAST: ICD-10-CM

## 2023-02-17 PROCEDURE — 77067 SCR MAMMO BI INCL CAD: CPT

## 2023-03-01 ENCOUNTER — HOSPITAL ENCOUNTER (OUTPATIENT)
Dept: PHYSICAL THERAPY | Age: 73
Discharge: HOME | End: 2023-03-01
Payer: MEDICARE

## 2023-03-01 PROCEDURE — 97162 PT EVAL MOD COMPLEX 30 MIN: CPT

## 2023-03-01 NOTE — PROGRESS NOTES
PT INITIAL EVALUATION NOTE - [facility] 2-15    Patient Name: Silvana Rodriguez  Date:3/1/2023  : 1950  [x]  Patient  Verified  Payor: Sophia Phalen / Plan: Donnamaria Schilder / Product Type: Managed Care Medicare /    In time: 11:10a  Out time: 1200p  Total Treatment Time (min): 50  Total Timed Codes (min): 7  1:1 Treatment Time ( [address] only): 7   Visit #: 1     Treatment Area: Right shoulder pain [M25.511]  Right knee pain [M25.561]    SUBJECTIVE  Pain Level (0-10 scale): 2/10 R arm, 2-3 R knee. Any medication changes, allergies to medications, adverse drug reactions, diagnosis change, or new procedure performed?: [] No    [x] Yes (see summary sheet for update)  Subjective:    R knee pain has been ongoing for 6 months worst with stairs and walking. Difficulty getting out of her car and standing from sitting. Is worried that it will give out on her. Knee XR: \"no fracture or other acute osseous or articular abnormality. There is a small joint effusion. \"    R shoulder and arm pain just started suddenly 3 months ago. Bringing arm across her body, pulling up clothes, playing with grandchild. Difficulty sleeping because of the pain- feels better when she moves around. Little discomfort carrying heavy item with R hand. Denies problems driving. Notices  is not as strong on R. PLOF: R handed. Walking occasionally if the weather is warm. Mechanism of Injury: insidious  Previous Treatment/Compliance: stretching  PMHx/Surgical Hx: thyroid problem, cholesterol, osteopenia  Work Hx: retired  Living Situation: Full flight in home  Pt Goals:  To walk without pain  Barriers: none  Motivation: motivated  Substance use: none  Cognition: A & O x 4        OBJECTIVE/EXAMINATION  Posture:  R scapula winging  Functional  and Pinch:  R:36.8 L:55  Palpation: TTP superior knee  Neurological: Reflexes / Sensations: pt denies    R Shoulder ROM: AROM      Flexion   WFL      Extension  WFLp! Abduction  WFLp! Adduction  WFL      IR   WFLp! ER   WFLp! Right Knee ROM: lacking 4d ext, 124d pain with full extension  Left Knee ROM: 0-134      Joint Mobility Assessment: Glenohumeral: WNL      Patellar mobility: WNL    Girth Measurments:  Mid patella: R 15.4\" L 15\"      UPPER QUARTER MUSCLE STRENGTH     R  L  SH Flexion  4p  4+  SH Extension  4  4+  SH Abduction  4p  4+  IR   4p  4  ER   4  4   Elbow Flexion  4+p  4+  Elbow Extension 4+  4+    LOWER QUARTER MUSCLE STRENGTH     R    DF   5   PF   4  Knee Ext  4  Knee Flex  4  Hip flex  4    Special Tests: Maral: -      Scapular Reposition: -  Shoulder Abduction: +     Load and Shift: -   Apprehension: +    Relocation : -    Speed's: +    AC Crossover: +   Lift Off Sign: +     Special Tests:  Knee Varus/Valgus Stress: -  Knee Lachmans: -    SLS: 30 B no UE support  Trendelenburg: +         7 min Therapeutic Exercise:  [x] See flow sheet :   Rationale: increase ROM, increase strength, and decrease pain  to improve the patients ability to reach overhead and walk with less pain. With   [x] TE   [] TA   [] Neuro   [] SC   [] other: Patient Education: [x] Review HEP    [] Progressed/Changed HEP based on:   [] positioning   [] body mechanics   [] transfers   [x] heat/ice application    [] other:      Other Objective/Functional Measures: FOTO Functional Measure: 54/100    Mod cues to perform activity within pain free ranges. Ind by end of session.     Pain Level (0-10 scale) post treatment: 2/10    ASSESSMENT/Changes in Function:     [x]  See Plan of Care    Claudio Moreno, PT, DPT 3/1/2023

## 2023-03-01 NOTE — THERAPY EVALUATION
Physical Therapy at [facility],   a part of  [facility]  [address] [facility] Roger Ortega  Phone: [phone]  Fax: [phone]    Plan of Care/Statement of Necessity for Physical Therapy Services  2-15    Patient name: Chaitanya Cameron  : 1950  Provider#: [id number]  Referral source: Christina Ruggiero MD      Medical/Treatment Diagnosis: Right shoulder pain [M25.511]  Right knee pain [M25.561]     Prior Hospitalization: see medical history     Comorbidities: thyroid dz, high cholesterol, osteopenia  Prior Level of Function: see eval  Medications: Verified on Patient Summary List  Start of Care: 3/1/23      Onset Date: 6 months ago   The Plan of Care and following information is based on the information from the initial evaluation. Assessment/ key information: Pt is a pleasant 67yo F presenting to OP PT with c/o R knee pain of insidious onset 6 months ago and R shoulder/upper arm pain of insidious onset 3 months ago. Pt reports R knee pain limits her walking tolerance, increases with stair navigation, increase fear of falling, and makes driving and transfers difficult. Pt reports that R arm pain is present with reaching across her body and overhead, is present with lifting and dressing. Pt demonstrates good R shoulder AROM with pain and limited knee AROM dt joint effusion. Pt demonstrates good UE and LE strength with pain. Pt would benefit from skilled PT services to address above deficits to improve ease with ADLs and improve QoL to return to general wellness activities. Evaluation Complexity History HIGH Complexity :3+ comorbidities / personal factors will impact the outcome/ POC ; Examination MEDIUM Complexity : 3 Standardized tests and measures addressing body structure, function, activity limitation and / or participation in recreation  ;Presentation MEDIUM Complexity : Evolving with changing characteristics  ; Clinical Decision Making MEDIUM Complexity : FOTO score of 26-74  Overall Complexity Rating: MEDIUM    Problem List: pain affecting function, decrease ROM, decrease strength, impaired gait/ balance, decrease ADL/ functional abilitiies, decrease activity tolerance, and decrease transfer abilities   Treatment Plan may include any combination of the following: Therapeutic exercise, Neuromuscular reeducation, Manual therapy, Therapeutic activity, Self care/home management, Electric stim unattended , Vasopneumatic device, and Gait training  Patient / Family readiness to learn indicated by: asking questions  Persons(s) to be included in education: patient (P)  Barriers to Learning/Limitations: None  Patient Goal (s): To walk without pain  Patient Self Reported Health Status: good  Rehabilitation Potential: good    Short Term Goals: To be accomplished in 4 weeks: The patient will be independent with introductory HEP with no v.c. The patient will demonstrate knee flexion AROM to 134 deg to allow for donning and doffing of all LE clothing and shoe wear without assistance needed  The patient will demonstrate pain free knee extension A/PROM to improve gait mechanics when ambulating a minimum of 250 ft  The patient will transfer sit to stand without UE assistance to improve functional mobility in home setting. The patient will improve R shoulder flexion AROM to 130 deg without pain to improve ease with overhead reaching tasks  The patient will demonstrate pain free R shoulder ER AROM WFL to allow for doning and doffing of clothing without assistance needed      Long Term Goals: To be accomplished in 12 weeks:   The patient will demonstrate 5/5 R LE strength to allow for ambulation up and down a minimum of 4 standard stairs  The patient will subjectively report the ability to ambulate on uneven surfaces without fear of falling x 500 ft  The patient will demonstrate independence with finalized HEP without v.c. needed to allow for all transfers, ambulation x 500 ft and all in home functional mobilty  The patient will negotiate 12 steps reciprocally without an increase in pain from baseline level or without reported fear of falling and without UE assistance on railing to improve safety in the home. The patient will demonstrate pain free shoulder AROM multidirectionally to improve ease with household chores such as dishes, laundry and cleaning tasks  The patient will report ability to unload groceries from car into home without an increase in pain within the last 5 days  The patient will demonstrate ability to lift 2 pounds overhead with pain level of 0/10 to improve ease of putting away dishes in kitchen     Frequency / Duration: Patient to be seen 1 time per week for 12 weeks    Patient/ Caregiver education and instruction: self care, activity modification, and exercises    [x]  Plan of care has been reviewed with PTA    Certification Period: 3/1/23-6/1/23    Mary Tiwari PT, DPT 3/1/2023     ________________________________________________________________________    I certify that the above Therapy Services are being furnished while the patient is under my care. I agree with the treatment plan and certify that this therapy is necessary.     Physician's Signature:____________________  Date:____________Time: _________         Augie Turner MD

## 2023-03-09 ENCOUNTER — HOSPITAL ENCOUNTER (OUTPATIENT)
Dept: PHYSICAL THERAPY | Age: 73
Discharge: HOME | End: 2023-03-09
Payer: MEDICARE

## 2023-03-09 PROCEDURE — 97110 THERAPEUTIC EXERCISES: CPT | Performed by: PHYSICAL MEDICINE & REHABILITATION

## 2023-03-09 PROCEDURE — 97016 VASOPNEUMATIC DEVICE THERAPY: CPT | Performed by: PHYSICAL MEDICINE & REHABILITATION

## 2023-03-16 ENCOUNTER — HOSPITAL ENCOUNTER (OUTPATIENT)
Dept: PHYSICAL THERAPY | Age: 73
Discharge: HOME | End: 2023-03-16
Payer: MEDICARE

## 2023-03-16 PROCEDURE — 97110 THERAPEUTIC EXERCISES: CPT | Performed by: PHYSICAL THERAPIST

## 2023-03-16 PROCEDURE — 97140 MANUAL THERAPY 1/> REGIONS: CPT | Performed by: PHYSICAL THERAPIST

## 2023-03-16 NOTE — PROGRESS NOTES
PT DAILY TREATMENT NOTE - [facility] -15    Patient Name: Elisa Peacock  Date:3/16/2023  : 1950  [x]  Patient  Verified  Payor: Cat Trejo / Plan: Osmel Hummel / Product Type: Managed Care Medicare /    In time:215 p  Out time: 300 pm  Total Treatment Time (min): 45  Total Timed Codes (min): 45    Visit #: 3      Treatment Area: Right shoulder pain [M25.511]  Right knee pain [M25.561]    SUBJECTIVE  Pain Level (0-10 scale): 1/10 R shoulder 1/10 R knee  Any medication changes, allergies to medications, adverse drug reactions, diagnosis change, or new procedure performed?: [x] No    [] Yes (see summary sheet for update)  Subjective functional status/changes:   [] No changes reported  Patient reports that she felt good following previous visit, but had some increased pain last night trying to sleep. Notes she feels her shoulder more because she uses her arm more often during the day.      OBJECTIVE    Modality rationale: decrease edema, decrease inflammation, decrease pain, and increase tissue extensibility to improve the patients ability to ADLs, lift and carry, walking tolerance   Min Type Additional Details    [] Estim: []Att   []Unatt        []TENS instruct                  []IFC  []Premod   []NMES                     []Other:  []w/US   []w/ice   []w/heat  Position:  Location:    []  Traction: [] Cervical       []Lumbar                       [] Prone          []Supine                       []Intermittent   []Continuous Lbs:  [] before manual  [] after manual  []w/heat    []  Ultrasound: []Continuous   [] Pulsed at:                           []1MHz   []3MHz Location:  W/cm2:    [] Paraffin         Location:   []w/heat    []  Ice     [x]  Heat  []  Ice massage Position: supine  Location: R shoulder    []  Laser  []  Other: Position:  Location:      [x]  Vasopneumatic Device R knee Pressure:       [x] lo [] med [] hi   Temperature: 34     [x] Skin assessment post-treatment:  [x]intact []redness- no adverse reaction    []redness - adverse reaction:     37 min Therapeutic Exercise:  [x] See flow sheet :   Rationale: increase ROM, increase strength, and improve coordination to improve the patients ability to ADLs, standing and walking tolerance    8 min Manual Therapy: R shoulder PROM in all directions, inferior GH jt mobs for improved flexion, STM/TPR posterior capsule and pectorals    Rationale: decrease pain, increase ROM, increase tissue extensibility, and decrease trigger points to improve the patients ability to use RUE for ADLs and household chores without increased pain. With   [x] TE   [] TA   [] neuro   [] other: Patient Education: [x] Review HEP    [] Progressed/Changed HEP based on:   [] positioning   [] body mechanics   [] transfers   [] heat/ice application    [] other:      Other Objective/Functional Measures:   Improved shoulder flexion with reduced pain following manual interventions. Required cueing for proper form throughout. Pain Level (0-10 scale) post treatment: 0/10    ASSESSMENT/Changes in Function:     Patient will continue to benefit from skilled PT services to modify and progress therapeutic interventions, address functional mobility deficits, address ROM deficits, address strength deficits, analyze and address soft tissue restrictions, analyze and cue movement patterns, analyze and modify body mechanics/ergonomics, and assess and modify postural abnormalities to attain remaining goals. []  See Plan of Care  []  See progress note/recertification  []  See Discharge Summary         Progress towards goals / Updated goals:  Patient tolerated today's session very well. Will added standing strengthening next visit as tolerated.     PLAN  [x]  Upgrade activities as tolerated     [x]  Continue plan of care  [x]  Update interventions per flow sheet       []  Discharge due to:_  []  Other:_      Johnny Ortiz, PT  3/16/2023

## 2023-03-23 ENCOUNTER — HOSPITAL ENCOUNTER (OUTPATIENT)
Dept: PHYSICAL THERAPY | Age: 73
Discharge: HOME | End: 2023-03-23
Payer: MEDICARE

## 2023-03-23 PROCEDURE — 97110 THERAPEUTIC EXERCISES: CPT

## 2023-03-23 NOTE — PROGRESS NOTES
PT DAILY TREATMENT NOTE - [facility] 2-15    Patient Name: Geovanna Read  Date:3/23/2023  : 1950  [x]  Patient  Verified  Payor: Page Kaur / Plan: Kath Risk / Product Type: Managed Care Medicare /    In time:11:15am  Out time: 11:59am  Total Treatment Time (min): 44  Total Timed Codes (min): 44    Visit #: 4    Treatment Area: Right shoulder pain [M25.511]  Right knee pain [M25.561]    SUBJECTIVE  Pain Level (0-10 scale): 1/10 R shoulder 1/10 R knee  Any medication changes, allergies to medications, adverse drug reactions, diagnosis change, or new procedure performed?: [x] No    [] Yes (see summary sheet for update)  Subjective functional status/changes:   [] No changes reported  Patient reports that she felt really good following previous visit. Doing HEP less this week because she was sick and taking care of grandson.       OBJECTIVE    Modality rationale: decrease edema, decrease inflammation, decrease pain, and increase tissue extensibility to improve the patients ability to ADLs, lift and carry, walking tolerance   Min Type Additional Details    [] Estim: []Att   []Unatt        []TENS instruct                  []IFC  []Premod   []NMES                     []Other:  []w/US   []w/ice   []w/heat  Position:  Location:    []  Traction: [] Cervical       []Lumbar                       [] Prone          []Supine                       []Intermittent   []Continuous Lbs:  [] before manual  [] after manual  []w/heat    []  Ultrasound: []Continuous   [] Pulsed at:                           []1MHz   []3MHz Location:  W/cm2:    [] Paraffin         Location:   []w/heat    []  Ice     [x]  Heat  []  Ice massage Position: supine  Location: R shoulder    []  Laser  []  Other: Position:  Location:      [x]  Vasopneumatic Device R knee Pressure:       [x] lo [] med [] hi   Temperature: 34     [x] Skin assessment post-treatment:  [x]intact []redness- no adverse reaction    []redness - adverse reaction:     44 min Therapeutic Exercise:  [x] See flow sheet :   Rationale: increase ROM, increase strength, and improve coordination to improve the patients ability to ADLs, standing and walking tolerance    0 min Manual Therapy: R shoulder PROM in all directions, inferior GH jt mobs for improved flexion, STM/TPR posterior capsule and pectorals    Rationale: decrease pain, increase ROM, increase tissue extensibility, and decrease trigger points to improve the patients ability to use RUE for ADLs and household chores without increased pain. With   [x] TE   [] TA   [] neuro   [] other: Patient Education: [x] Review HEP    [] Progressed/Changed HEP based on:   [] positioning   [] body mechanics   [] transfers   [] heat/ice application    [] other:      Other Objective/Functional Measures:   Great tolerance of progressed standing activity. Required cueing for proper form throughout. Muscle fatigue and decreased endurance noted with STS. Cues to prevent genu valgum     Pain Level (0-10 scale) post treatment: 0/10 \"tired\"    ASSESSMENT/Changes in Function:     Patient will continue to benefit from skilled PT services to modify and progress therapeutic interventions, address functional mobility deficits, address ROM deficits, address strength deficits, analyze and address soft tissue restrictions, analyze and cue movement patterns, analyze and modify body mechanics/ergonomics, and assess and modify postural abnormalities to attain remaining goals. []  See Plan of Care  []  See progress note/recertification  []  See Discharge Summary         Progress towards goals / Updated goals:  Patient tolerated today's session very well. Will added standing strengthening as tolerated.  PN next visit    PLAN  [x]  Upgrade activities as tolerated     [x]  Continue plan of care  [x]  Update interventions per flow sheet       []  Discharge due to:_  []  Other:_      Kelly Garcia, PT, DPT  3/23/2023

## 2023-03-30 ENCOUNTER — HOSPITAL ENCOUNTER (OUTPATIENT)
Dept: PHYSICAL THERAPY | Age: 73
End: 2023-03-30
Payer: MEDICARE

## 2023-03-30 PROCEDURE — 97110 THERAPEUTIC EXERCISES: CPT

## 2023-03-30 NOTE — PROGRESS NOTES
PT DAILY TREATMENT NOTE - [facility] 2-15    Patient Name: Benigno Oconnor  Date:3/30/2023  : 1950  [x]  Patient  Verified  Payor: Bonita Batres / Plan: Medina Lizet / Product Type: Managed Care Medicare /    In time:11:03am  Out time: 11:45am  Total Treatment Time (min): 42  Total Timed Codes (min): 42  Visit #: 5    Treatment Area: Right shoulder pain [M25.511]  Right knee pain [M25.561]    SUBJECTIVE  Pain Level (0-10 scale): 0/10 R shoulder 1-2/10 R knee  Any medication changes, allergies to medications, adverse drug reactions, diagnosis change, or new procedure performed?: [x] No    [] Yes (see summary sheet for update)  Subjective functional status/changes:   [] No changes reported  Patient reports that she felt really good following previous visit. No increase in her pain or changes in her symptoms.      OBJECTIVE    Modality rationale: decrease edema, decrease inflammation, decrease pain, and increase tissue extensibility to improve the patients ability to ADLs, lift and carry, walking tolerance   Min Type Additional Details    [] Estim: []Att   []Unatt        []TENS instruct                  []IFC  []Premod   []NMES                     []Other:  []w/US   []w/ice   []w/heat  Position:  Location:    []  Traction: [] Cervical       []Lumbar                       [] Prone          []Supine                       []Intermittent   []Continuous Lbs:  [] before manual  [] after manual  []w/heat    []  Ultrasound: []Continuous   [] Pulsed at:                           []1MHz   []3MHz Location:  W/cm2:    [] Paraffin         Location:   []w/heat    []  Ice     [x]  Heat  []  Ice massage Position: supine  Location: R shoulder    []  Laser  []  Other: Position:  Location:      [x]  Vasopneumatic Device R knee Pressure:       [x] lo [] med [] hi   Temperature: 34     [x] Skin assessment post-treatment:  [x]intact []redness- no adverse reaction    []redness - adverse reaction:     42 min Therapeutic Exercise:  [x] See flow sheet :   Rationale: increase ROM, increase strength, and improve coordination to improve the patients ability to ADLs, standing and walking tolerance    0 min Manual Therapy: R shoulder PROM in all directions, inferior GH jt mobs for improved flexion, STM/TPR posterior capsule and pectorals    Rationale: decrease pain, increase ROM, increase tissue extensibility, and decrease trigger points to improve the patients ability to use RUE for ADLs and household chores without increased pain. With   [x] TE   [] TA   [] neuro   [] other: Patient Education: [x] Review HEP    [] Progressed/Changed HEP based on:   [] positioning   [] body mechanics   [] transfers   [] heat/ice application    [] other:      Other Objective/Functional Measures:   Great tolerance of progressed standing activity. Required minimal verbal cueing for proper form throughout. Muscle fatigue noted without pain onset. Pain Level (0-10 scale) post treatment: 0/10     ASSESSMENT/Changes in Function:     Patient will continue to benefit from skilled PT services to modify and progress therapeutic interventions, address functional mobility deficits, address ROM deficits, address strength deficits, analyze and address soft tissue restrictions, analyze and cue movement patterns, analyze and modify body mechanics/ergonomics, and assess and modify postural abnormalities to attain remaining goals. []  See Plan of Care  [x]  See progress note/recertification  []  See Discharge Summary         Progress towards goals / Updated goals:  Patient tolerated today's session very well. Will added standing strengthening as tolerated. Will return on 4/19 after vacation.      PLAN  [x]  Upgrade activities as tolerated     [x]  Continue plan of care  [x]  Update interventions per flow sheet       []  Discharge due to:_  []  Other:_      Sigifredo Oviedo, PT, DPT  3/30/2023

## 2023-03-30 NOTE — PROGRESS NOTES
Physical Therapy at [facility],   a part of  [facility]  [address] [facility] Roger Ortega  Phone: [phone]      Fax:  [phone]    Progress Note    Name: Danny Cook   : 1950   MD: Eliza Garcia MD     Treatment Diagnosis: Right shoulder pain [M25.511]  Right knee pain [M25.561]  Start of Care: 3/1/23    Visits from Start of Care: 5  Missed Visits: 0    Summary of Care: Pt has completed 5 PT visits to address R shoulder and R knee pain including therapeutic exercise, patient education, manual therapy, and modalities with great progress towards all goals. Pt demonstrates significant improvement in R shoulder and R knee ROM without pain as well as increased LE strength. FOTO score has improved from 54 to 72. Pt reports increase confidence with ambulation on uneven surfaces but intermittent pain with stair navigation and end range R shoulder ER. Pt would benefit from continued skilled PT services to address remaining deficits to return to PLOF without restriction. Assessment / Recommendations:   Short Term Goals: To be accomplished in 4 weeks:   The patient will be independent with introductory HEP with no v.c. - MET  The patient will demonstrate knee flexion AROM to 134 deg to allow for donning and doffing of all LE clothing and shoe wear without assistance needed - NOT MET (124d without pain)  The patient will demonstrate pain free knee extension A/PROM to improve gait mechanics when ambulating a minimum of 250 ft- MET  The patient will transfer sit to stand without UE assistance to improve functional mobility in home setting. - MET  The patient will improve R shoulder flexion AROM to 130 deg without pain to improve ease with overhead reaching tasks- MET  The patient will demonstrate pain free R shoulder ER AROM WFL to allow for doning and doffing of clothing without assistance needed  - IN PROGRESS (AROM met but pain at end range)     Long Term Goals: To be accomplished in 12 weeks: The patient will demonstrate 5/5 R LE strength to allow for ambulation up and down a minimum of 4 standard stairs- IN PROGRESS (all 5/5 except hip flexion 4/5)  The patient will subjectively report the ability to ambulate on uneven surfaces without fear of falling x 500 ft- MET  The patient will demonstrate independence with finalized HEP without v.c. needed to allow for all transfers, ambulation x 500 ft and all in home functional mobilty- NT  The patient will negotiate 12 steps reciprocally without an increase in pain from baseline level or without reported fear of falling and without UE assistance on railing to improve safety in the home.   - IN PROGRESS (every day is different)  The patient will demonstrate pain free shoulder AROM multidirectionally to improve ease with household chores such as dishes, laundry and cleaning tasks- IN PROGRESS  The patient will report ability to unload groceries from car into home without an increase in pain within the last 5 days - MET  The patient will demonstrate ability to lift 2 pounds overhead with pain level of 0/10 to improve ease of putting away dishes in kitchen - MET    Dex Alvarenga, PT, DPT 3/30/2023

## 2023-04-19 ENCOUNTER — TELEPHONE (OUTPATIENT)
Dept: FAMILY MEDICINE CLINIC | Age: 73
End: 2023-04-19

## 2023-04-19 ENCOUNTER — HOSPITAL ENCOUNTER (OUTPATIENT)
Dept: PHYSICAL THERAPY | Age: 73
Discharge: HOME | End: 2023-04-19
Payer: MEDICARE

## 2023-04-19 DIAGNOSIS — E78.2 MIXED HYPERLIPIDEMIA: ICD-10-CM

## 2023-04-19 PROCEDURE — 97110 THERAPEUTIC EXERCISES: CPT

## 2023-04-19 RX ORDER — ATORVASTATIN CALCIUM 80 MG/1
80 TABLET, FILM COATED ORAL DAILY
Qty: 90 TABLET | Refills: 3 | Status: SHIPPED | OUTPATIENT
Start: 2023-04-19

## 2023-04-19 NOTE — PROGRESS NOTES
PT DAILY TREATMENT NOTE - [facility] 2-15    Patient Name: Mimi Schneider  Date:2023  : 1950  [x]  Patient  Verified  Payor: Gracenancy Saldana / Plan: Deepali Westfall / Product Type: Managed Care Medicare /    In time:11:45am  Out time: 12:30pm  Total Treatment Time (min): 45  Total Timed Codes (min): 45  Visit #: 6    Treatment Area: Right shoulder pain [M25.511]  Right knee pain [M25.561]    SUBJECTIVE  Pain Level (0-10 scale): 0/10 R shoulder 2-3/10 R knee  Any medication changes, allergies to medications, adverse drug reactions, diagnosis change, or new procedure performed?: [x] No    [] Yes (see summary sheet for update)  Subjective functional status/changes:   [] No changes reported  Patient reports that she has felt pretty good since last visit. Shoulder is not restricting her at all. Knee bothering her most when standing from sitting. OBJECTIVE    45 min Therapeutic Exercise:  [x] See flow sheet :   Rationale: increase ROM, increase strength, and improve coordination to improve the patients ability to ADLs, standing and walking tolerance          With   [x] TE   [] TA   [] neuro   [] other: Patient Education: [x] Review HEP    [] Progressed/Changed HEP based on:   [] positioning   [] body mechanics   [] transfers   [] heat/ice application    [] other:      Other Objective/Functional Measures: knee pain improved with bike today. Most discomfort at R anterior knee with STS. Required minimal verbal cueing for proper form throughout. Muscle fatigue noted without pain onset for rest of exercises. Pt provided with updated HEP for d/c.     Pain Level (0-10 scale) post treatment: 0/10     ASSESSMENT/Changes in Function:     Patient will continue to benefit from skilled PT services to modify and progress therapeutic interventions, address functional mobility deficits, address ROM deficits, address strength deficits, analyze and address soft tissue restrictions, analyze and cue movement patterns, analyze and modify body mechanics/ergonomics, and assess and modify postural abnormalities to attain remaining goals.      []  See Plan of Care  []  See progress note/recertification  [x]  See Discharge Summary         Progress towards goals / Updated goals:  D/C    PLAN  [x]  Upgrade activities as tolerated     [x]  Continue plan of care  [x]  Update interventions per flow sheet       []  Discharge due to:_  []  Other:_      Luisa Medina, PT, DPT  4/19/2023

## 2023-04-19 NOTE — TELEPHONE ENCOUNTER
Patient called stating that she needed a refill on her Atorvastatin medication. She stated that she is almost out of it so she will need it to be sent to the pharmacy soon. Thanks!

## 2023-04-19 NOTE — THERAPY DISCHARGE
Physical Therapy at [facility],   a part of  [facility]  [place]  [facility]  Roger Escobedo  Phone: [phone] Fax: [phone]      Discharge Summary 2-15    Patient name: Gayathri Riggs  : 1950  Provider#: [id number]  Referral source: Yves Patel MD      Medical/Treatment Diagnosis: Right shoulder pain [M25.511]  Right knee pain [M25.561]     Prior Hospitalization: see medical history     Comorbidities: See Plan of Care  Prior Level of Function: See Plan of Care  Medications: Verified on Patient Summary List    Start of Care: 3/1/23      Onset Date: 2022   Visits from Start of Care: 6     Missed Visits: 0  Reporting Period : 3/1/23 to 23    Assessment/Summary of care: Pt has completed 6 PT visits to address R shoulder and R knee pain including therapeutic exercise, patient education, manual therapy, and modalities with great progress towards all goals. Pt demonstrates significant improvement in R shoulder AROM without pain as well as increased LE strength. FOTO score has improved from 54 to 72 since initial evaluation. Pt reports increase confidence with ambulation on uneven surfaces but intermittent R knee pain with stair navigation and transfers consistent with arthritis. Pt agreeable to continue HEP independently and d/c from skilled PT at this time. Short Term Goals: To be accomplished in 4 weeks:   The patient will be independent with introductory HEP with no v.c. - MET  The patient will demonstrate knee flexion AROM to 134 deg to allow for donning and doffing of all LE clothing and shoe wear without assistance needed - NOT MET (124d without pain)  The patient will demonstrate pain free knee extension A/PROM to improve gait mechanics when ambulating a minimum of 250 ft- MET  The patient will transfer sit to stand without UE assistance to improve functional mobility in home setting. - MET  The patient will improve R shoulder flexion AROM to 130 deg without pain to improve ease with overhead reaching tasks- MET  The patient will demonstrate pain free R shoulder ER AROM WFL to allow for doning and doffing of clothing without assistance needed  - MET     Long Term Goals: To be accomplished in 12 weeks: The patient will demonstrate 5/5 R LE strength to allow for ambulation up and down a minimum of 4 standard stairs- IN PROGRESS (all 5/5 except hip flexion 4/5)  The patient will subjectively report the ability to ambulate on uneven surfaces without fear of falling x 500 ft- MET  The patient will demonstrate independence with finalized HEP without v.c. needed to allow for all transfers, ambulation x 500 ft and all in home functional mobilty- MET  The patient will negotiate 12 steps reciprocally without an increase in pain from baseline level or without reported fear of falling and without UE assistance on railing to improve safety in the home.   - IN PROGRESS (every day is different)  The patient will demonstrate pain free shoulder AROM multidirectionally to improve ease with household chores such as dishes, laundry and cleaning tasks- MET  The patient will report ability to unload groceries from car into home without an increase in pain within the last 5 days - MET  The patient will demonstrate ability to lift 2 pounds overhead with pain level of 0/10 to improve ease of putting away dishes in kitchen - MET    RECOMMENDATIONS:  [x]Discontinue therapy: [x]Patient has reached or is progressing toward set goals     []Patient is non-compliant or has abdicated     []Due to lack of appreciable progress towards set goals     []Other    Steven Dominguez, PT, DPT 4/19/2023

## 2023-04-19 NOTE — TELEPHONE ENCOUNTER
Refilled 1 year supply of medication although previously 1 year supply sent in 12/2022.      Aysha Jean MD

## 2023-05-10 ENCOUNTER — TELEPHONE (OUTPATIENT)
Age: 73
End: 2023-05-10

## 2023-05-10 DIAGNOSIS — E78.2 MIXED HYPERLIPIDEMIA: Primary | ICD-10-CM

## 2023-05-10 NOTE — TELEPHONE ENCOUNTER
I reached out to the pharmacy to see if the patient had any refills. There is refills for 80mg of atorvastatin not 40mg,which the patient wants due to 80mg being to strong for her. Please be advised.

## 2023-05-11 RX ORDER — ATORVASTATIN CALCIUM 40 MG/1
40 TABLET, FILM COATED ORAL DAILY
Qty: 90 TABLET | Refills: 3 | Status: SHIPPED | OUTPATIENT
Start: 2023-05-11

## 2023-05-30 RX ORDER — LEVOTHYROXINE SODIUM 88 UG/1
TABLET ORAL
Qty: 90 TABLET | Refills: 1 | Status: SHIPPED | OUTPATIENT
Start: 2023-05-30 | End: 2023-06-16

## 2023-06-15 PROBLEM — R42 ORTHOSTATIC LIGHTHEADEDNESS: Status: RESOLVED | Noted: 2022-12-12 | Resolved: 2023-06-15

## 2023-06-15 PROBLEM — M25.441 SWELLING OF HAND JOINT, RIGHT: Status: ACTIVE | Noted: 2023-06-15

## 2023-06-15 PROBLEM — R09.89 PHLEGM IN THROAT: Status: ACTIVE | Noted: 2023-06-15

## 2023-08-02 ENCOUNTER — OFFICE VISIT (OUTPATIENT)
Age: 73
End: 2023-08-02
Payer: COMMERCIAL

## 2023-08-02 VITALS
HEIGHT: 67 IN | BODY MASS INDEX: 26.27 KG/M2 | RESPIRATION RATE: 18 BRPM | DIASTOLIC BLOOD PRESSURE: 79 MMHG | OXYGEN SATURATION: 98 % | HEART RATE: 74 BPM | SYSTOLIC BLOOD PRESSURE: 145 MMHG | TEMPERATURE: 97.5 F | WEIGHT: 167.4 LBS

## 2023-08-02 DIAGNOSIS — E03.9 HYPOTHYROIDISM, UNSPECIFIED TYPE: Primary | ICD-10-CM

## 2023-08-02 DIAGNOSIS — E03.9 ACQUIRED HYPOTHYROIDISM: ICD-10-CM

## 2023-08-02 PROCEDURE — 1123F ACP DISCUSS/DSCN MKR DOCD: CPT | Performed by: FAMILY MEDICINE

## 2023-08-02 PROCEDURE — 3074F SYST BP LT 130 MM HG: CPT | Performed by: FAMILY MEDICINE

## 2023-08-02 PROCEDURE — 3078F DIAST BP <80 MM HG: CPT | Performed by: FAMILY MEDICINE

## 2023-08-02 PROCEDURE — 99213 OFFICE O/P EST LOW 20 MIN: CPT

## 2023-08-02 SDOH — ECONOMIC STABILITY: FOOD INSECURITY: WITHIN THE PAST 12 MONTHS, YOU WORRIED THAT YOUR FOOD WOULD RUN OUT BEFORE YOU GOT MONEY TO BUY MORE.: PATIENT DECLINED

## 2023-08-02 SDOH — ECONOMIC STABILITY: HOUSING INSECURITY
IN THE LAST 12 MONTHS, WAS THERE A TIME WHEN YOU DID NOT HAVE A STEADY PLACE TO SLEEP OR SLEPT IN A SHELTER (INCLUDING NOW)?: PATIENT REFUSED

## 2023-08-02 SDOH — ECONOMIC STABILITY: INCOME INSECURITY: HOW HARD IS IT FOR YOU TO PAY FOR THE VERY BASICS LIKE FOOD, HOUSING, MEDICAL CARE, AND HEATING?: PATIENT DECLINED

## 2023-08-02 SDOH — ECONOMIC STABILITY: FOOD INSECURITY: WITHIN THE PAST 12 MONTHS, THE FOOD YOU BOUGHT JUST DIDN'T LAST AND YOU DIDN'T HAVE MONEY TO GET MORE.: PATIENT DECLINED

## 2023-08-02 ASSESSMENT — PATIENT HEALTH QUESTIONNAIRE - PHQ9
SUM OF ALL RESPONSES TO PHQ QUESTIONS 1-9: 0
1. LITTLE INTEREST OR PLEASURE IN DOING THINGS: 0
2. FEELING DOWN, DEPRESSED OR HOPELESS: 0
SUM OF ALL RESPONSES TO PHQ9 QUESTIONS 1 & 2: 0
SUM OF ALL RESPONSES TO PHQ QUESTIONS 1-9: 0

## 2023-08-03 LAB — TSH SERPL DL<=0.05 MIU/L-ACNC: 1.77 UIU/ML (ref 0.36–3.74)

## 2023-08-03 RX ORDER — LEVOTHYROXINE SODIUM 0.07 MG/1
75 TABLET ORAL DAILY
Qty: 60 TABLET | Refills: 1 | Status: SHIPPED | OUTPATIENT
Start: 2023-08-03

## 2023-09-01 ENCOUNTER — ANESTHESIA EVENT (OUTPATIENT)
Facility: HOSPITAL | Age: 73
End: 2023-09-01
Payer: MEDICARE

## 2023-09-01 ENCOUNTER — ANESTHESIA (OUTPATIENT)
Facility: HOSPITAL | Age: 73
End: 2023-09-01
Payer: MEDICARE

## 2023-09-01 ENCOUNTER — HOSPITAL ENCOUNTER (OUTPATIENT)
Facility: HOSPITAL | Age: 73
Setting detail: OUTPATIENT SURGERY
Discharge: HOME | End: 2023-09-01
Attending: INTERNAL MEDICINE | Admitting: INTERNAL MEDICINE
Payer: MEDICARE

## 2023-09-01 VITALS
WEIGHT: 165.79 LBS | SYSTOLIC BLOOD PRESSURE: 131 MMHG | TEMPERATURE: 97.7 F | HEIGHT: 67 IN | HEART RATE: 69 BPM | DIASTOLIC BLOOD PRESSURE: 78 MMHG | RESPIRATION RATE: 18 BRPM | OXYGEN SATURATION: 96 % | BODY MASS INDEX: 26.02 KG/M2

## 2023-09-01 PROCEDURE — 2709999900 HC NON-CHARGEABLE SUPPLY: Performed by: INTERNAL MEDICINE

## 2023-09-01 PROCEDURE — 3700000000 HC ANESTHESIA ATTENDED CARE: Performed by: INTERNAL MEDICINE

## 2023-09-01 PROCEDURE — 2580000003 HC RX 258: Performed by: INTERNAL MEDICINE

## 2023-09-01 PROCEDURE — 88305 TISSUE EXAM BY PATHOLOGIST: CPT

## 2023-09-01 PROCEDURE — 3600007502: Performed by: INTERNAL MEDICINE

## 2023-09-01 PROCEDURE — 3600007512: Performed by: INTERNAL MEDICINE

## 2023-09-01 PROCEDURE — 6360000002 HC RX W HCPCS: Performed by: NURSE ANESTHETIST, CERTIFIED REGISTERED

## 2023-09-01 PROCEDURE — 7100000010 HC PHASE II RECOVERY - FIRST 15 MIN: Performed by: INTERNAL MEDICINE

## 2023-09-01 PROCEDURE — 3700000001 HC ADD 15 MINUTES (ANESTHESIA): Performed by: INTERNAL MEDICINE

## 2023-09-01 PROCEDURE — 7100000011 HC PHASE II RECOVERY - ADDTL 15 MIN: Performed by: INTERNAL MEDICINE

## 2023-09-01 RX ORDER — PROPOFOL 10 MG/ML
INJECTION, EMULSION INTRAVENOUS CONTINUOUS PRN
Status: DISCONTINUED | OUTPATIENT
Start: 2023-09-01 | End: 2023-09-01 | Stop reason: SDUPTHER

## 2023-09-01 RX ORDER — SODIUM CHLORIDE 9 MG/ML
INJECTION, SOLUTION INTRAVENOUS CONTINUOUS
Status: DISCONTINUED | OUTPATIENT
Start: 2023-09-01 | End: 2023-09-01 | Stop reason: HOSPADM

## 2023-09-01 RX ADMIN — SODIUM CHLORIDE: 9 INJECTION, SOLUTION INTRAVENOUS at 08:00

## 2023-09-01 RX ADMIN — PROPOFOL 30 MG: 10 INJECTION, EMULSION INTRAVENOUS at 08:07

## 2023-09-01 RX ADMIN — PROPOFOL 200 MCG/KG/MIN: 10 INJECTION, EMULSION INTRAVENOUS at 08:06

## 2023-09-01 ASSESSMENT — PAIN - FUNCTIONAL ASSESSMENT: PAIN_FUNCTIONAL_ASSESSMENT: 0-10

## 2023-09-01 ASSESSMENT — PAIN SCALES - GENERAL
PAINLEVEL_OUTOF10: 0

## 2023-09-01 NOTE — DISCHARGE INSTRUCTIONS
[address] [facility] Tierra Mata M.D.  [phone]            COLON DISCHARGE INSTRUCTIONS       2023    Collier Bosworth  :  1950  [facility] Medical Record Number:  [id number]      COLONOSCOPY FINDINGS:  Your colonoscopy showed one small polyp that was removed and sent to pathology, small internal hemorrhoids, otherwise looked within normal.    DISCOMFORT:  Redness at IV site- apply warm compress to area; if redness or soreness persist- contact your physician  There may be a slight amount of blood passed from the rectum  Gaseous discomfort- walking, belching will help relieve any discomfort  You may not operate a vehicle for 12 hours  You may not engage in an occupation involving machinery or appliances for rest of today  You may not drink alcoholic beverages for at least 12 hours  Avoid making any critical decisions for at least 24 hour  DIET:   High fiber diet   - however -  remember your colon is empty and a heavy meal will produce gas. Avoid these foods:  vegetables, fried / greasy foods, carbonated drinks for today     ACTIVITY:  You may resume your normal daily activities it is recommended that you spend the remainder of the day resting -  avoid any strenuous activity. CALL M.D. ANY SIGN OF:   Increasing pain, nausea, vomiting  Abdominal distension (swelling)  New increased bleeding (oral or rectal)  Fever (chills)  Pain in chest area  Bloody discharge from nose or mouth   Shortness of breath    Follow-up Instructions:   Call Dr. Dayanara Amaya if any questions or problems. Telephone # [phone]  Biopsy results will be available in  5 to 7 days  Repeat colonoscopy in 5 years.

## 2023-09-01 NOTE — PROGRESS NOTES
Endoscopy discharge instructions have been reviewed and given to patient. The patient verbalized understanding and acceptance of instructions. Dr. Courtney Sierra discussed with patient procedure findings and next steps.

## 2023-09-01 NOTE — PROGRESS NOTES
Ginny Paz  1950  [id number]    Situation:  Verbal report received from: Margie Andersen   Procedure: Procedure(s):  COLONOSCOPY  COLONOSCOPY POLYPECTOMY SNARE/COLD BIOPSY    Background:    Preoperative diagnosis: Personal history of colonic polyps [Z86.010]  Postoperative diagnosis: * No post-op diagnosis entered *    :  Dr. Ishmael Romo  Assistant(s): Circulator: Chrystal Medel RN  Endoscopy Technician: Lexy Escalante    Specimens:   ID Type Source Tests Collected by Time Destination   A : Transverse Colon Polyp Tissue Colon-Transverse SURGICAL PATHOLOGY Kari Burger MD 9/1/2023 5601      H. Pylori    no    Assessment:  Intra-procedure medications     Anesthesia gave intra-procedure sedation and medications, see anesthesia flow sheet   yes    Intravenous fluids: NS@ KVO     Vital signs stable   yes    Abdominal assessment: round and soft   yes    Recommendation:  Discharge patient per MD order  yes.   Return to floor  outpatient  Family or Friend   family  Permission to share finding with family or friend   yes

## 2023-09-01 NOTE — H&P
Esvin Harvey M.D.  [phone]    History and Physical       NAME:  Cheryle Michael   :   1950   MRN:   [id number]       Referring Physician:  Dr. Trae Ortiz Date: 2023 8:08 AM    Chief Complaint:  Colon cancer screening    History of Present Illness:  Patient is a 67 y.o. who is seen for colon cancer screening. Denies any ongoing GI complaints. PMH:  Past Medical History:   Diagnosis Date    Hypercholesterolemia     Hypertension     Thyroid disease        PSH:  Past Surgical History:   Procedure Laterality Date     SECTION         Allergies: Allergies   Allergen Reactions    Naproxen Sodium Hives and Swelling       Home Medications:  Prior to Admission Medications   Prescriptions Last Dose Informant Patient Reported? Taking?   atorvastatin (LIPITOR) 40 MG tablet Not Taking  No No   Sig: Take 1 tablet by mouth daily   Patient not taking: Reported on 2023   ipratropium (ATROVENT) 0.06 % nasal spray Not Taking  No No   Si sprays by Each Nostril route in the morning, at noon, and at bedtime   Patient not taking: Reported on 2023   levothyroxine (SYNTHROID) 75 MCG tablet 2023  No No   Sig: Take 1 tablet by mouth daily   melatonin 5 MG TABS tablet Past Week  Yes No   Sig: Take 2 tablets by mouth as needed   valsartan (DIOVAN) 80 MG tablet 2023  Yes No   Sig: Take 1 tablet by mouth daily      Facility-Administered Medications: None       Hospital Medications:  Current Facility-Administered Medications   Medication Dose Route Frequency    0.9 % sodium chloride infusion   IntraVENous Continuous     Facility-Administered Medications Ordered in Other Encounters   Medication Dose Route Frequency    propofol infusion   IntraVENous Continuous PRN       Social History:  Social History     Tobacco Use    Smoking status: Never    Smokeless tobacco: Never   Substance Use Topics    Alcohol use:  Yes     Alcohol/week: 1.0 standard drink     Comment:

## 2023-09-01 NOTE — ANESTHESIA POSTPROCEDURE EVALUATION
Department of Anesthesiology  Postprocedure Note    Patient: Ragini Loja  MRN: [id number]  YOB: 1950  Date of evaluation: 9/1/2023      Procedure Summary     Date: 09/01/23 Room / Location: [facility] ENDO 02 / [facility] ENDOSCOPY    Anesthesia Start: 1487 Anesthesia Stop: 9577    Procedures:       COLONOSCOPY (Lower GI Region)      COLONOSCOPY POLYPECTOMY SNARE/COLD BIOPSY (Lower GI Region) Diagnosis:       Personal history of colonic polyps      (Personal history of colonic polyps [Z86.010])    Surgeons: Nathan Dawn MD Responsible Provider: Mere Frederick DO    Anesthesia Type: MAC ASA Status: 2          Anesthesia Type: No value filed. Tom Phase I: Tom Score: 10    Tom Phase II: Tom Score: 9      Anesthesia Post Evaluation    Patient location during evaluation: PACU  Patient participation: complete - patient participated  Level of consciousness: awake  Pain score: 0  Airway patency: patent  Nausea & Vomiting: no vomiting and no nausea  Complications: no  Cardiovascular status: hemodynamically stable  Respiratory status: acceptable  Hydration status: stable  Comments: Patient seen and examined. Ready for discharge from PACU.   Pain management: adequate

## 2023-09-01 NOTE — PERIOP NOTE
Received recovery report from anesthesia team, see anesthesia note. Abdomen remains soft and non-tender post-procedure. Pt has no complaints at this time and tolerated procedure well. Endoscope was pre-cleaned at the bedside by Kevin Celestin immediately following procedure. Post recovery report given to [facility]. Apolonio Fothergill

## 2023-09-01 NOTE — OP NOTE
Onesimo Galicia M.D.  [phone]            2023          Colonoscopy Operative Report  Gordon Vallejo  :  1950  [facility] Medical Record Number:  [id number]      Indications:    Personal history of colon polyps (screening only)     :  Elva Estrella MD    Referring Provider: Terry Garcia DO    Sedation:  MAC anesthesia    Pre-Procedural Exam:      Airway: clear,  No airway problems anticipated  Heart: RRR, without gallops or rubs  Lungs: clear bilaterally without wheezes, crackles, or rhonchi  Abdomen: soft, nontender, nondistended, bowel sounds present  Mental Status: awake, alert and oriented to person, place and time     Procedure Details:  After informed consent was obtained with all risks and benefits of procedure explained and preoperative exam completed, the patient was taken to the endoscopy suite and placed in the left lateral decubitus position. Upon sequential sedation as per above, a digital rectal exam was performed. The Olympus videocolonoscope  was inserted in the rectum and carefully advanced to the cecum, which was identified by the ileocecal valve and appendiceal orifice. The quality of preparation was good. The colonoscope was slowly withdrawn with careful inspection and evaluation between folds. Retroflexion in the rectum was performed. Findings:   Terminal Ileum: not intubated  Cecum: normal  Ascending Colon: normal  Transverse Colon: 1  Sessile polyp(s), the largest 3 mm in size; Descending Colon: normal  Sigmoid: normal  Rectum: no mucosal lesion appreciated  Grade 1 internal hemorrhoid(s); Interventions:  1 complete polypectomy were performed using cold snare  and the polyps were  retrieved    Specimen Removed:  specimen #1, 3 mm in size, located in the transverse colon removed by cold snare and retrieved for pathology    Complications: None. EBL:  None.     Impression:  One diminutive polyp removed and sent to pathology, otherwise mucosa within normal                        Small internal hemorrhoids    Recommendations:  -Repeat colonoscopy in 5 years.   -High fiber diet.    -Resume current medication(s)    Discharge Disposition:  Home in the company of a  when able to ambulate.     Nathan Dawn MD  9/1/2023  8:24 AM

## 2023-09-01 NOTE — ANESTHESIA PRE PROCEDURE
POC Tests: No results for input(s): POCGLU, POCNA, POCK, POCCL, POCBUN, POCHEMO, POCHCT in the last 72 hours. Coags: No results found for: PROTIME, INR, APTT    HCG (If Applicable): No results found for: PREGTESTUR, PREGSERUM, HCG, HCGQUANT     ABGs: No results found for: PHART, PO2ART, FAV2RSL, WMY4TXW, BEART, J8PPLZKG     Type & Screen (If Applicable):  No results found for: LABABO, LABRH    Drug/Infectious Status (If Applicable):  Lab Results   Component Value Date/Time    HEPCAB NONREACTIVE 07/22/2022 03:15 PM       COVID-19 Screening (If Applicable): No results found for: COVID19        Anesthesia Evaluation  Patient summary reviewed and Nursing notes reviewed  Airway: Mallampati: III     Neck ROM: full  Mouth opening: > = 3 FB   Dental: normal exam         Pulmonary:Negative Pulmonary ROS breath sounds clear to auscultation                             Cardiovascular:  Exercise tolerance: good (>4 METS),   (+) hypertension:,         Rhythm: regular  Rate: normal                    Neuro/Psych:                ROS comment: Chronic insomnia GI/Hepatic/Renal:            ROS comment: Previous polyp (5 years ago). Endo/Other:    (+) hypothyroidism::., .                 Abdominal:             Vascular: negative vascular ROS. Other Findings:           Anesthesia Plan      MAC     ASA 2             Anesthetic plan and risks discussed with patient.                         Diony Portillo DO   9/1/2023

## 2023-09-22 ENCOUNTER — TELEPHONE (OUTPATIENT)
Age: 73
End: 2023-09-22

## 2023-10-02 DIAGNOSIS — E03.9 HYPOTHYROIDISM, UNSPECIFIED TYPE: ICD-10-CM

## 2023-10-02 DIAGNOSIS — E03.9 ACQUIRED HYPOTHYROIDISM: ICD-10-CM

## 2023-10-02 RX ORDER — LEVOTHYROXINE SODIUM 0.07 MG/1
75 TABLET ORAL DAILY
Qty: 60 TABLET | Refills: 1 | Status: SHIPPED | OUTPATIENT
Start: 2023-10-02

## 2023-10-09 DIAGNOSIS — E03.9 HYPOTHYROIDISM, UNSPECIFIED TYPE: ICD-10-CM

## 2023-10-09 DIAGNOSIS — E03.9 ACQUIRED HYPOTHYROIDISM: ICD-10-CM

## 2023-10-09 RX ORDER — LEVOTHYROXINE SODIUM 0.07 MG/1
75 TABLET ORAL DAILY
Qty: 60 TABLET | Refills: 1 | OUTPATIENT
Start: 2023-10-09

## 2023-11-29 ENCOUNTER — TELEPHONE (OUTPATIENT)
Age: 73
End: 2023-11-29

## 2023-11-29 NOTE — TELEPHONE ENCOUNTER
Medication Refill Request    Belinda Welhc is requesting a refill of the following medication(s):   Requested Prescriptions      Levothyroxine 75mcg    Called patient to inquire about dosage for Levothyroxine. Fax refill request request a dosage of 8mcg. Whereas chart reflects 75mcg. Called patient in inquire dosage, states that provider changed her dosage at last visit. Appointments made to see a provider on Dr. Dennis's team as Dr. Dennis does not have any availability until January. Informed will forward refill request to MD for review, but may be best to wait until appointment to discuss dosage. Patient also reported she stopped taking her Atrovastatin due to recent cholesterol readings. States hse has already informed her provider of this information.    Listed PCP is Jaziel Dennis DO   Last provider to prescribe medication: Dr. Dennis  Last Date of Medication Prescribed: 10/02/2023   Date of Last Office Visit at [facility]: 08/02/2023   FUTURE APPOINTMENT:   Future Appointments   Date Time Provider Department Center   12/4/2023 11:00 AM Jose M Alfredo MD [facility] BS AMB       Please send refill to:    [id number] [place], [place] - [address] [facility] - P [phone] - F [phone] [address] [facility] [address]  Phone: [phone] Fax: [phone]      Please review request and approve or deny with recommendations.

## 2023-12-04 ENCOUNTER — OFFICE VISIT (OUTPATIENT)
Age: 73
End: 2023-12-04
Payer: MEDICARE

## 2023-12-04 VITALS
WEIGHT: 166.4 LBS | OXYGEN SATURATION: 97 % | HEIGHT: 67 IN | TEMPERATURE: 98 F | RESPIRATION RATE: 18 BRPM | DIASTOLIC BLOOD PRESSURE: 84 MMHG | BODY MASS INDEX: 26.12 KG/M2 | HEART RATE: 85 BPM | SYSTOLIC BLOOD PRESSURE: 144 MMHG

## 2023-12-04 DIAGNOSIS — R73.03 PREDIABETES: ICD-10-CM

## 2023-12-04 DIAGNOSIS — I10 PRIMARY HYPERTENSION: ICD-10-CM

## 2023-12-04 DIAGNOSIS — E03.9 ACQUIRED HYPOTHYROIDISM: Primary | ICD-10-CM

## 2023-12-04 DIAGNOSIS — E78.5 HYPERLIPIDEMIA, UNSPECIFIED HYPERLIPIDEMIA TYPE: ICD-10-CM

## 2023-12-04 PROCEDURE — 99214 OFFICE O/P EST MOD 30 MIN: CPT

## 2023-12-04 PROCEDURE — 99204 OFFICE O/P NEW MOD 45 MIN: CPT

## 2023-12-04 SDOH — ECONOMIC STABILITY: INCOME INSECURITY: HOW HARD IS IT FOR YOU TO PAY FOR THE VERY BASICS LIKE FOOD, HOUSING, MEDICAL CARE, AND HEATING?: PATIENT DECLINED

## 2023-12-04 SDOH — ECONOMIC STABILITY: FOOD INSECURITY: WITHIN THE PAST 12 MONTHS, YOU WORRIED THAT YOUR FOOD WOULD RUN OUT BEFORE YOU GOT MONEY TO BUY MORE.: PATIENT DECLINED

## 2023-12-04 SDOH — ECONOMIC STABILITY: FOOD INSECURITY: WITHIN THE PAST 12 MONTHS, THE FOOD YOU BOUGHT JUST DIDN'T LAST AND YOU DIDN'T HAVE MONEY TO GET MORE.: PATIENT DECLINED

## 2023-12-04 ASSESSMENT — PATIENT HEALTH QUESTIONNAIRE - PHQ9
SUM OF ALL RESPONSES TO PHQ9 QUESTIONS 1 & 2: 0
SUM OF ALL RESPONSES TO PHQ QUESTIONS 1-9: 0
2. FEELING DOWN, DEPRESSED OR HOPELESS: 0
1. LITTLE INTEREST OR PLEASURE IN DOING THINGS: 0
SUM OF ALL RESPONSES TO PHQ QUESTIONS 1-9: 0

## 2023-12-04 NOTE — PROGRESS NOTES
[facility] [place], [address] [facility]   Office [phone], Fax [phone]    Subjective:     Chief Complaint   Patient presents with    Follow-up Chronic Condition     Follow-up on synthroid. The dosage was changed. HPI:  Brie Bland is a 67 y.o. female with a history of hypothyroidism, hypertension. That presents for:    Hypothyroidism:   Still feels very tired. States she doesn't sleep well, some nights she gets about 7 hours but only with melatonin. Average 6 hours at night. No issues with palpitations or tachycardia, no issues with temperature intolerance. Denies any depressive symptoms. Originally diagnosed over 20 years ago. Hypertension: Current Blood Pressure Initially 144/84, on recheck 152/82. Does not check at home  Home medications include: Diovan   History of stroke or MI: No   Current symptoms include: No headache, dizziness, lightheadedness        Health Maintenance:  Health Maintenance Due   Topic Date Due    COVID-19 Vaccine (1) Never done    DTaP/Tdap/Td vaccine (1 - Tdap) Never done    Shingles vaccine (1 of 2) Never done    DEXA (modify frequency per FRAX score)  Never done    Respiratory Syncytial Virus (RSV) age 61 yrs+ (3 - 1-dose 60+ series) Never done    Pneumococcal 65+ years Vaccine (1 - PCV) Never done    Flu vaccine (1) Never done    Annual Wellness Visit (AWV)  08/23/2023          Past Medical Hx  I personally reviewed. Past Medical History:   Diagnosis Date    Hypercholesterolemia     Hypertension     Thyroid disease         SocHx   I personally reviewed. Social History     Socioeconomic History    Marital status: Single     Spouse name: Not on file    Number of children: Not on file    Years of education: Not on file    Highest education level: Not on file   Occupational History    Not on file   Tobacco Use    Smoking status: Never    Smokeless tobacco: Never   Vaping Use    Vaping Use: Never used   Substance and Sexual Activity    Alcohol use:  Yes

## 2023-12-05 DIAGNOSIS — E03.9 HYPOTHYROIDISM, UNSPECIFIED TYPE: ICD-10-CM

## 2023-12-05 DIAGNOSIS — E03.9 ACQUIRED HYPOTHYROIDISM: ICD-10-CM

## 2023-12-05 LAB
ANION GAP SERPL CALC-SCNC: 7 MMOL/L (ref 5–15)
BUN SERPL-MCNC: 14 MG/DL (ref 6–20)
BUN/CREAT SERPL: 18 (ref 12–20)
CALCIUM SERPL-MCNC: 9.1 MG/DL (ref 8.5–10.1)
CHLORIDE SERPL-SCNC: 105 MMOL/L (ref 97–108)
CHOLEST SERPL-MCNC: 292 MG/DL
CO2 SERPL-SCNC: 29 MMOL/L (ref 21–32)
CREAT SERPL-MCNC: 0.79 MG/DL (ref 0.55–1.02)
ERYTHROCYTE [DISTWIDTH] IN BLOOD BY AUTOMATED COUNT: 12.7 % (ref 11.5–14.5)
EST. AVERAGE GLUCOSE BLD GHB EST-MCNC: 117 MG/DL
GLUCOSE SERPL-MCNC: 117 MG/DL (ref 65–100)
HBA1C MFR BLD: 5.7 % (ref 4–5.6)
HCT VFR BLD AUTO: 41.8 % (ref 35–47)
HDLC SERPL-MCNC: 63 MG/DL
HDLC SERPL: 4.6 (ref 0–5)
HGB BLD-MCNC: 14 G/DL (ref 11.5–16)
LDLC SERPL CALC-MCNC: 212.4 MG/DL (ref 0–100)
MCH RBC QN AUTO: 32.3 PG (ref 26–34)
MCHC RBC AUTO-ENTMCNC: 33.5 G/DL (ref 30–36.5)
MCV RBC AUTO: 96.5 FL (ref 80–99)
NRBC # BLD: 0 K/UL (ref 0–0.01)
NRBC BLD-RTO: 0 PER 100 WBC
PLATELET # BLD AUTO: 210 K/UL (ref 150–400)
PMV BLD AUTO: 11.6 FL (ref 8.9–12.9)
POTASSIUM SERPL-SCNC: 3.8 MMOL/L (ref 3.5–5.1)
RBC # BLD AUTO: 4.33 M/UL (ref 3.8–5.2)
SODIUM SERPL-SCNC: 141 MMOL/L (ref 136–145)
TRIGL SERPL-MCNC: 83 MG/DL
TSH SERPL DL<=0.05 MIU/L-ACNC: 2.72 UIU/ML (ref 0.36–3.74)
VLDLC SERPL CALC-MCNC: 16.6 MG/DL
WBC # BLD AUTO: 6.2 K/UL (ref 3.6–11)

## 2023-12-06 DIAGNOSIS — E03.9 HYPOTHYROIDISM, UNSPECIFIED TYPE: ICD-10-CM

## 2023-12-06 DIAGNOSIS — E03.9 ACQUIRED HYPOTHYROIDISM: ICD-10-CM

## 2023-12-06 RX ORDER — LEVOTHYROXINE SODIUM 0.07 MG/1
75 TABLET ORAL DAILY
Qty: 60 TABLET | Refills: 1 | OUTPATIENT
Start: 2023-12-06

## 2023-12-06 NOTE — TELEPHONE ENCOUNTER
Medication Refill Request    Isha Suzette is requesting a refill of the following medication(s):   Requested Prescriptions     Pending Prescriptions Disp Refills    levothyroxine (SYNTHROID) 75 MCG tablet 60 tablet 1     Sig: Take 1 tablet by mouth daily        Listed PCP is Aurea Cook DO   Last provider to prescribe medication: Dr. Renea Nelson on 10/02/2023  Date of Last Office Visit at [facility]: 12/04/2023 with Dr. Bruce Covert: No future appointments. Please send refill to:    [facility] [address] [place], [facility] [place] [address]  Phone: [phone] Fax: [phone]      Please review request and approve or deny with recommendations.

## 2023-12-08 DIAGNOSIS — E03.9 ACQUIRED HYPOTHYROIDISM: ICD-10-CM

## 2023-12-08 DIAGNOSIS — E03.9 HYPOTHYROIDISM, UNSPECIFIED TYPE: ICD-10-CM

## 2023-12-12 RX ORDER — LEVOTHYROXINE SODIUM 0.07 MG/1
75 TABLET ORAL DAILY
Qty: 60 TABLET | Refills: 1 | Status: SHIPPED | OUTPATIENT
Start: 2023-12-12

## 2023-12-12 RX ORDER — LEVOTHYROXINE SODIUM 0.07 MG/1
75 TABLET ORAL DAILY
Qty: 60 TABLET | Refills: 1 | OUTPATIENT
Start: 2023-12-12

## 2024-01-08 NOTE — TELEPHONE ENCOUNTER
Not applicable.   Tazorac Counseling:  Patient advised that medication is irritating and drying.  Patient may need to apply sparingly and wash off after an hour before eventually leaving it on overnight.  The patient verbalized understanding of the proper use and possible adverse effects of tazorac.  All of the patient's questions and concerns were addressed.

## 2024-01-31 NOTE — TELEPHONE ENCOUNTER
Medication Refill Request    Belinda Welch is requesting a refill of the following medication(s):   Requested Prescriptions      No prescriptions requested or ordered in this encounter        Listed PCP is Jaziel Dennis,    Last provider to prescribe medication: Dr. PHOENIX Woods  Last Date of Medication Prescribed: 12/12/22   Date of Last Office Visit at [facility]: 12/4/23 (VV on 12/21/23)   FUTURE APPOINTMENT: No future appointments.    Please send refill to:    [id number] [place], [place] - [address] [facility] - P [phone] - F [phone] [address] [facility] [address]  Phone: [phone] Fax: [phone]      Please review request and approve or deny with recommendations.

## 2024-02-01 RX ORDER — VALSARTAN 80 MG/1
80 TABLET ORAL DAILY
Qty: 90 TABLET | Refills: 2 | Status: SHIPPED | OUTPATIENT
Start: 2024-02-01

## 2024-02-13 DIAGNOSIS — E03.9 HYPOTHYROIDISM, UNSPECIFIED TYPE: ICD-10-CM

## 2024-02-13 DIAGNOSIS — E03.9 ACQUIRED HYPOTHYROIDISM: ICD-10-CM

## 2024-02-13 DIAGNOSIS — E78.2 MIXED HYPERLIPIDEMIA: ICD-10-CM

## 2024-02-13 NOTE — TELEPHONE ENCOUNTER
Medication Refill Request    Belinda Welch is requesting a refill of the following medication(s):   Requested Prescriptions     Pending Prescriptions Disp Refills    atorvastatin (LIPITOR) 40 MG tablet 90 tablet 3     Sig: Take 1 tablet by mouth daily    levothyroxine (SYNTHROID) 75 MCG tablet 90 tablet 3     Sig: Take 1 tablet by mouth daily        Listed PCP is Jaziel Dennis DO   Last provider to prescribe medication: Dr. Nini Woods  Last Date of Medication Prescribed: 05/11/2023   Date of Last Office Visit at [facility]: 12/04/2023   FUTURE APPOINTMENT: No future appointments.    Please send refill to:    [id number] [place], [place] - [address] [facility] - P [phone] - F [phone]  [facility] [facility] [address]  Phone: [phone] Fax: [phone]      Please review request and approve or deny with recommendations.

## 2024-02-15 RX ORDER — ATORVASTATIN CALCIUM 40 MG/1
40 TABLET, FILM COATED ORAL DAILY
Qty: 90 TABLET | Refills: 3 | Status: SHIPPED | OUTPATIENT
Start: 2024-02-15

## 2024-02-15 RX ORDER — LEVOTHYROXINE SODIUM 0.07 MG/1
75 TABLET ORAL DAILY
Qty: 90 TABLET | Refills: 3 | Status: SHIPPED | OUTPATIENT
Start: 2024-02-15

## 2024-05-10 ENCOUNTER — TELEPHONE (OUTPATIENT)
Age: 74
End: 2024-05-10

## 2024-05-10 NOTE — TELEPHONE ENCOUNTER
Pt stated that she has recently moved to [place]; however, her new insurance does not begin until June 1st and she could not schedule a dr appt in a new location. She is requesting refills of the following medications:    levothyroxine (SYNTHROID) 75 MCG tablet     valsartan (DIOVAN) 80 MG tablet     She is requesting a 30 day supply to last until she may be able to secure a new PCP in [place]. If appropriate, please send to [facility] at [address], [place] [address], phone number is [phone].    Thank you

## 2024-05-10 NOTE — TELEPHONE ENCOUNTER
Pt called again. She asked to inform you that she is out of medication and hope that you are able to send Rx to pharmacy today.

## (undated) DEVICE — CONTAINER SPEC 20 ML LID NEUT BUFF FORMALIN 10 % POLYPR STS

## (undated) DEVICE — 3M™ CUROS™ DISINFECTING CAP FOR NEEDLELESS CONNECTORS 270/CARTON 20 CARTONS/CASE CFF1-270: Brand: CUROS™

## (undated) DEVICE — ELECTRODE PT RET AD L9FT HI MOIST COND ADH HYDRGEL CORDED

## (undated) DEVICE — SET GRAV CK VLV NEEDLESS ST 3 GANGED 4WAY STPCOCK HI FLO 10